# Patient Record
Sex: FEMALE | Race: WHITE | NOT HISPANIC OR LATINO | Employment: OTHER | URBAN - METROPOLITAN AREA
[De-identification: names, ages, dates, MRNs, and addresses within clinical notes are randomized per-mention and may not be internally consistent; named-entity substitution may affect disease eponyms.]

---

## 2018-02-07 ENCOUNTER — TELEPHONE (OUTPATIENT)
Dept: CARDIOLOGY | Facility: CLINIC | Age: 83
End: 2018-02-07

## 2018-02-07 ENCOUNTER — EDUCATION (OUTPATIENT)
Dept: CARDIOLOGY | Facility: CLINIC | Age: 83
End: 2018-02-07

## 2018-02-07 DIAGNOSIS — I35.0 NODULAR CALCIFIC AORTIC VALVE STENOSIS: Primary | ICD-10-CM

## 2018-02-07 NOTE — PROGRESS NOTES
You have been scheduled for a procedure in the echo lab on Friday, February 16, 2018.     Please report to the Cardiology Waiting Area on the Third floor of the hospital and check in at 8 AM.     You will then be taken to the SSCU (Short Stay Cardiac Unit) and prepared for your procedure. Please be aware that this is not the time of your procedure but the time you are to arrive. The procedures are scheduled on an hourly basis; however, emergency cases take precedence over all other cases.     You may not have anything to eat or drink after midnight the night before your test. You may take your regular morning medications with water.    The procedure will take 1-2 hours to perform. After the procedure, you will return to SSCU on the third floor of the hospital. Your family may remain in the room with you during this time.     You will be discharged home that same afternoon. You must have someone to drive you home.  Your doctor will determine, based on your progress, the time of your discharge. The results of your procedure will be discussed with you before you are discharged. Any further testing or procedures will be scheduled for you either before you leave or you will be called with these appointments.     You will be contacted by the echo department prior to your procedure for a  pre-procedure questionnaire.    If you should have any questions, concerns, or need to change the date of your procedure, please call [facility] @ [phone].    Special Instructions        THE ABOVE INSTRUCTIONS WERE GIVEN TO THE PATIENT VERBALLY AND THEY VERBALIZED UNDERSTANDING.  THEY DO NOT REQUIRE ANY SPECIAL NEEDS AND DO NOT HAVE ANY LEARNING BARRIERS.          Directions for Reporting to Cardiology Waiting Area in the Hospital  If you park in the Parking Garage:  Take elevators to the1st floor of the parking garage.  Continue past the gift shop, coffee shop, and piano.  Take a right and go to the gold elevators. (Elevator B)  Take the  elevator to the 3rd floor.  Follow the arrow on the sign on the wall that says Cath Lab Registration/EP Lab Registration.  Follow the long hallway all the way around until you come to a big open area.  This is the registration area.  Check in at Reception Desk.    OR    If family is dropping you off:  Have them drop you off at the front of the Hospital under the green overhang.  Enter through the doors and take a right.  Take the E elevators to the 3rd floor Cardiology Waiting Area.  Check in at the Reception Desk in the waiting room.

## 2018-02-07 NOTE — TELEPHONE ENCOUNTER
Called patient to schedule appointment for IVAN prior to interventional valve clinic appointment. IVAN scheduled for 02/16 @ 10am. Verbalized all pre procedure instructions. Was told to call if had further questions.

## 2018-02-08 ENCOUNTER — TELEPHONE (OUTPATIENT)
Dept: CARDIOLOGY | Facility: CLINIC | Age: 83
End: 2018-02-08

## 2018-02-08 NOTE — TELEPHONE ENCOUNTER
Patient called about IVAN scheduled on 2/16/18. Pre-procedural questions asked.  Denies swallowing issues and esophageal issues. Denies previous sedation/anesthesia problems. States does not snore or have sleep apnea.  Denies recent trauma/surgery/radiation therapy to head/neck/airway. States is able to move neck without difficulty. IVAN described to patient. Instructed NPO past midnight and to have a designated  to drive patient home after the procedures due to sedation being given. Instructed to report to   sscu at 0800 am.  Verbalizes understanding. Questions answered.

## 2018-02-12 ENCOUNTER — HOSPITAL ENCOUNTER (INPATIENT)
Facility: HOSPITAL | Age: 83
LOS: 5 days | Discharge: HOME HEALTH | DRG: 291 | End: 2018-02-17
Attending: EMERGENCY MEDICINE | Admitting: HOSPITALIST
Payer: MEDICARE

## 2018-02-12 DIAGNOSIS — R58 ECCHYMOSIS: ICD-10-CM

## 2018-02-12 DIAGNOSIS — I50.9 CHF (CONGESTIVE HEART FAILURE): ICD-10-CM

## 2018-02-12 DIAGNOSIS — D64.9 ANEMIA, UNSPECIFIED TYPE: ICD-10-CM

## 2018-02-12 DIAGNOSIS — Z95.2 S/P AVR (AORTIC VALVE REPLACEMENT): ICD-10-CM

## 2018-02-12 DIAGNOSIS — T82.03XA PARAVALVULAR LEAK OF PROSTHETIC HEART VALVE, INITIAL ENCOUNTER: ICD-10-CM

## 2018-02-12 DIAGNOSIS — J96.21 ACUTE ON CHRONIC RESPIRATORY FAILURE WITH HYPOXEMIA: ICD-10-CM

## 2018-02-12 DIAGNOSIS — I50.9 ACUTE ON CHRONIC CONGESTIVE HEART FAILURE: ICD-10-CM

## 2018-02-12 DIAGNOSIS — T82.03XA: ICD-10-CM

## 2018-02-12 DIAGNOSIS — I50.9 ACUTE ON CHRONIC CONGESTIVE HEART FAILURE, UNSPECIFIED CONGESTIVE HEART FAILURE TYPE: Primary | ICD-10-CM

## 2018-02-12 DIAGNOSIS — Z85.118 HISTORY OF LUNG CANCER: ICD-10-CM

## 2018-02-12 PROBLEM — L53.9 ERYTHEMA OF BREAST: Status: ACTIVE | Noted: 2018-02-12

## 2018-02-12 PROBLEM — E78.5 HYPERLIPEMIA: Status: ACTIVE | Noted: 2018-02-12

## 2018-02-12 PROBLEM — I25.10 CAD (CORONARY ARTERY DISEASE): Status: ACTIVE | Noted: 2018-02-12

## 2018-02-12 PROBLEM — I10 ESSENTIAL HYPERTENSION: Status: ACTIVE | Noted: 2018-02-12

## 2018-02-12 LAB
ALBUMIN SERPL BCP-MCNC: 3.1 G/DL
ALP SERPL-CCNC: 76 U/L
ALT SERPL W/O P-5'-P-CCNC: 128 U/L
ANION GAP SERPL CALC-SCNC: 10 MMOL/L
AST SERPL-CCNC: 76 U/L
BASOPHILS # BLD AUTO: 0.05 K/UL
BASOPHILS NFR BLD: 0.6 %
BILIRUB SERPL-MCNC: 1.1 MG/DL
BNP SERPL-MCNC: 2108 PG/ML
BUN SERPL-MCNC: 41 MG/DL
CALCIUM SERPL-MCNC: 9.2 MG/DL
CHLORIDE SERPL-SCNC: 106 MMOL/L
CO2 SERPL-SCNC: 24 MMOL/L
CREAT SERPL-MCNC: 1.2 MG/DL
DIFFERENTIAL METHOD: ABNORMAL
EOSINOPHIL # BLD AUTO: 0.1 K/UL
EOSINOPHIL NFR BLD: 0.8 %
ERYTHROCYTE [DISTWIDTH] IN BLOOD BY AUTOMATED COUNT: 25 %
EST. GFR  (AFRICAN AMERICAN): 49 ML/MIN/1.73 M^2
EST. GFR  (NON AFRICAN AMERICAN): 42.5 ML/MIN/1.73 M^2
GLUCOSE SERPL-MCNC: 122 MG/DL
HCT VFR BLD AUTO: 32.4 %
HGB BLD-MCNC: 9.9 G/DL
IMM GRANULOCYTES # BLD AUTO: 0.03 K/UL
IMM GRANULOCYTES NFR BLD AUTO: 0.4 %
INR PPP: 1.1
LYMPHOCYTES # BLD AUTO: 0.6 K/UL
LYMPHOCYTES NFR BLD: 7.8 %
MCH RBC QN AUTO: 28.7 PG
MCHC RBC AUTO-ENTMCNC: 30.6 G/DL
MCV RBC AUTO: 94 FL
MONOCYTES # BLD AUTO: 0.7 K/UL
MONOCYTES NFR BLD: 8.4 %
NEUTROPHILS # BLD AUTO: 6.5 K/UL
NEUTROPHILS NFR BLD: 82 %
NRBC BLD-RTO: 0 /100 WBC
PLATELET # BLD AUTO: 206 K/UL
PMV BLD AUTO: 12.1 FL
POTASSIUM SERPL-SCNC: 4.5 MMOL/L
PROT SERPL-MCNC: 6.2 G/DL
PROTHROMBIN TIME: 11.6 SEC
RBC # BLD AUTO: 3.45 M/UL
SODIUM SERPL-SCNC: 140 MMOL/L
TROPONIN I SERPL DL<=0.01 NG/ML-MCNC: 0.41 NG/ML
WBC # BLD AUTO: 7.97 K/UL

## 2018-02-12 PROCEDURE — 99284 EMERGENCY DEPT VISIT MOD MDM: CPT | Mod: ,,, | Performed by: NURSE PRACTITIONER

## 2018-02-12 PROCEDURE — 63600175 PHARM REV CODE 636 W HCPCS: Performed by: NURSE PRACTITIONER

## 2018-02-12 PROCEDURE — 85610 PROTHROMBIN TIME: CPT

## 2018-02-12 PROCEDURE — 80053 COMPREHEN METABOLIC PANEL: CPT

## 2018-02-12 PROCEDURE — 99223 1ST HOSP IP/OBS HIGH 75: CPT | Mod: ,,, | Performed by: NURSE PRACTITIONER

## 2018-02-12 PROCEDURE — 84484 ASSAY OF TROPONIN QUANT: CPT

## 2018-02-12 PROCEDURE — 93005 ELECTROCARDIOGRAM TRACING: CPT

## 2018-02-12 PROCEDURE — 93010 ELECTROCARDIOGRAM REPORT: CPT | Mod: ,,, | Performed by: INTERNAL MEDICINE

## 2018-02-12 PROCEDURE — 83880 ASSAY OF NATRIURETIC PEPTIDE: CPT

## 2018-02-12 PROCEDURE — 99285 EMERGENCY DEPT VISIT HI MDM: CPT | Mod: 25

## 2018-02-12 PROCEDURE — 12000002 HC ACUTE/MED SURGE SEMI-PRIVATE ROOM

## 2018-02-12 PROCEDURE — 11000001 HC ACUTE MED/SURG PRIVATE ROOM

## 2018-02-12 PROCEDURE — 85025 COMPLETE CBC W/AUTO DIFF WBC: CPT

## 2018-02-12 PROCEDURE — 96374 THER/PROPH/DIAG INJ IV PUSH: CPT

## 2018-02-12 PROCEDURE — 94761 N-INVAS EAR/PLS OXIMETRY MLT: CPT

## 2018-02-12 RX ORDER — SERTRALINE HYDROCHLORIDE 25 MG/1
25 TABLET, FILM COATED ORAL DAILY
COMMUNITY

## 2018-02-12 RX ORDER — OMEPRAZOLE 20 MG/1
20 CAPSULE, DELAYED RELEASE ORAL DAILY
COMMUNITY

## 2018-02-12 RX ORDER — SODIUM CHLORIDE 0.9 % (FLUSH) 0.9 %
5 SYRINGE (ML) INJECTION
Status: DISCONTINUED | OUTPATIENT
Start: 2018-02-12 | End: 2018-02-17 | Stop reason: HOSPADM

## 2018-02-12 RX ORDER — CARVEDILOL 12.5 MG/1
25 TABLET ORAL 2 TIMES DAILY WITH MEALS
Status: DISCONTINUED | OUTPATIENT
Start: 2018-02-13 | End: 2018-02-13

## 2018-02-12 RX ORDER — ONDANSETRON 2 MG/ML
4 INJECTION INTRAMUSCULAR; INTRAVENOUS EVERY 8 HOURS PRN
Status: DISCONTINUED | OUTPATIENT
Start: 2018-02-12 | End: 2018-02-17 | Stop reason: HOSPADM

## 2018-02-12 RX ORDER — VERAPAMIL HYDROCHLORIDE 180 MG/1
180 TABLET, FILM COATED, EXTENDED RELEASE ORAL DAILY
Status: DISCONTINUED | OUTPATIENT
Start: 2018-02-13 | End: 2018-02-13

## 2018-02-12 RX ORDER — ONDANSETRON 8 MG/1
8 TABLET, ORALLY DISINTEGRATING ORAL EVERY 8 HOURS PRN
Status: DISCONTINUED | OUTPATIENT
Start: 2018-02-12 | End: 2018-02-17 | Stop reason: HOSPADM

## 2018-02-12 RX ORDER — ASPIRIN 81 MG/1
81 TABLET ORAL DAILY
Status: DISCONTINUED | OUTPATIENT
Start: 2018-02-13 | End: 2018-02-17 | Stop reason: HOSPADM

## 2018-02-12 RX ORDER — VERAPAMIL HYDROCHLORIDE 180 MG/1
180 CAPSULE, EXTENDED RELEASE ORAL DAILY
Status: ON HOLD | COMMUNITY
End: 2018-02-13

## 2018-02-12 RX ORDER — CLOPIDOGREL BISULFATE 75 MG/1
75 TABLET ORAL DAILY
Status: DISCONTINUED | OUTPATIENT
Start: 2018-02-13 | End: 2018-02-17 | Stop reason: HOSPADM

## 2018-02-12 RX ORDER — GLUCAGON 1 MG
1 KIT INJECTION
Status: DISCONTINUED | OUTPATIENT
Start: 2018-02-12 | End: 2018-02-17 | Stop reason: HOSPADM

## 2018-02-12 RX ORDER — IBUPROFEN 200 MG
24 TABLET ORAL
Status: DISCONTINUED | OUTPATIENT
Start: 2018-02-12 | End: 2018-02-17 | Stop reason: HOSPADM

## 2018-02-12 RX ORDER — HEPARIN SODIUM 5000 [USP'U]/ML
5000 INJECTION, SOLUTION INTRAVENOUS; SUBCUTANEOUS EVERY 8 HOURS
Status: DISCONTINUED | OUTPATIENT
Start: 2018-02-13 | End: 2018-02-13

## 2018-02-12 RX ORDER — BISACODYL 10 MG
10 SUPPOSITORY, RECTAL RECTAL DAILY PRN
Status: DISCONTINUED | OUTPATIENT
Start: 2018-02-12 | End: 2018-02-17 | Stop reason: HOSPADM

## 2018-02-12 RX ORDER — ASPIRIN 81 MG/1
81 TABLET ORAL DAILY
COMMUNITY

## 2018-02-12 RX ORDER — LISINOPRIL 20 MG/1
20 TABLET ORAL DAILY
Status: DISCONTINUED | OUTPATIENT
Start: 2018-02-13 | End: 2018-02-17 | Stop reason: HOSPADM

## 2018-02-12 RX ORDER — IBUPROFEN 200 MG
16 TABLET ORAL
Status: DISCONTINUED | OUTPATIENT
Start: 2018-02-12 | End: 2018-02-17 | Stop reason: HOSPADM

## 2018-02-12 RX ORDER — MECLIZINE HYDROCHLORIDE CHEWABLE TABLETS 25 MG/1
32 TABLET, CHEWABLE ORAL 3 TIMES DAILY PRN
COMMUNITY

## 2018-02-12 RX ORDER — CARVEDILOL 25 MG/1
25 TABLET ORAL 2 TIMES DAILY WITH MEALS
Status: ON HOLD | COMMUNITY
End: 2018-02-13

## 2018-02-12 RX ORDER — SERTRALINE HYDROCHLORIDE 25 MG/1
25 TABLET, FILM COATED ORAL DAILY
Status: DISCONTINUED | OUTPATIENT
Start: 2018-02-13 | End: 2018-02-17 | Stop reason: HOSPADM

## 2018-02-12 RX ORDER — CLOPIDOGREL BISULFATE 75 MG/1
75 TABLET ORAL DAILY
COMMUNITY

## 2018-02-12 RX ORDER — MECLIZINE HYDROCHLORIDE 25 MG/1
25 TABLET ORAL 3 TIMES DAILY PRN
Status: DISCONTINUED | OUTPATIENT
Start: 2018-02-12 | End: 2018-02-17 | Stop reason: HOSPADM

## 2018-02-12 RX ORDER — FUROSEMIDE 20 MG/1
20 TABLET ORAL 2 TIMES DAILY
Status: ON HOLD | COMMUNITY
End: 2018-02-13 | Stop reason: SDUPTHER

## 2018-02-12 RX ORDER — AMOXICILLIN 250 MG
1 CAPSULE ORAL 2 TIMES DAILY PRN
Status: DISCONTINUED | OUTPATIENT
Start: 2018-02-12 | End: 2018-02-17 | Stop reason: HOSPADM

## 2018-02-12 RX ORDER — FUROSEMIDE 10 MG/ML
40 INJECTION INTRAMUSCULAR; INTRAVENOUS 2 TIMES DAILY
Status: DISCONTINUED | OUTPATIENT
Start: 2018-02-13 | End: 2018-02-14

## 2018-02-12 RX ORDER — PANTOPRAZOLE SODIUM 40 MG/1
40 TABLET, DELAYED RELEASE ORAL DAILY
Status: DISCONTINUED | OUTPATIENT
Start: 2018-02-13 | End: 2018-02-17 | Stop reason: HOSPADM

## 2018-02-12 RX ORDER — ACETAMINOPHEN 325 MG/1
650 TABLET ORAL EVERY 6 HOURS PRN
Status: DISCONTINUED | OUTPATIENT
Start: 2018-02-12 | End: 2018-02-13

## 2018-02-12 RX ORDER — FUROSEMIDE 10 MG/ML
40 INJECTION INTRAMUSCULAR; INTRAVENOUS
Status: COMPLETED | OUTPATIENT
Start: 2018-02-12 | End: 2018-02-12

## 2018-02-12 RX ORDER — IPRATROPIUM BROMIDE AND ALBUTEROL SULFATE 2.5; .5 MG/3ML; MG/3ML
3 SOLUTION RESPIRATORY (INHALATION) EVERY 4 HOURS PRN
Status: DISCONTINUED | OUTPATIENT
Start: 2018-02-12 | End: 2018-02-17 | Stop reason: HOSPADM

## 2018-02-12 RX ORDER — LISINOPRIL 20 MG/1
20 TABLET ORAL DAILY
COMMUNITY

## 2018-02-12 RX ADMIN — FUROSEMIDE 40 MG: 10 INJECTION, SOLUTION INTRAMUSCULAR; INTRAVENOUS at 07:02

## 2018-02-12 NOTE — PROVIDER PROGRESS NOTES - EMERGENCY DEPT.
Encounter Date: 2/12/2018    ED Physician Progress Notes         EKG - STEMI Decision  Initial Reading: No STEMI present.    I, Teresa Hutchins, am scribing for, and in the presence of, Dr. Ashby. I performed the above scribed service and the documentation accurately describes the services I performed. I attest to the accuracy of the note.

## 2018-02-12 NOTE — ED PROVIDER NOTES
Encounter Date: 2/12/2018       History     Chief Complaint   Patient presents with    Congestive Heart Failure     pedal edema , increasing SOB. Using PRN O2 at home . Pulse ox 92 % at rest  lower w. exertion.  C/O left breast swelling  also.top of left foot is   bruised. Deneis chest pain .      Pt is a 84 yo female with medical history of CHF, bypass surgery, abdominal aneurysm and lung cancer (not presently on chemo or radiation) presents to the ED with complains of shortness of breath at rest, and with movement.  Symptoms include difficulty breathing, dyspnea on exertion, dyspnea when laying down and + 3 pedal edema. Pt states she takes 40 mg Lasix BID with no relief. Symptoms began 1 week ago, gradually worsening since that time.  Patient denies chest pain, constant cough, drainage from nose, frequent throat clearing, post nasal drip, sputum production, tightness in chest and wheezing. Pt denies and recent travel. Weight has been stable.  Appetite has been decreased. Symptoms are exacerbated by minimal activity and any movement. Pt also complains of left foot bruising.  Pt states she woke up this morning with bruising.  Pt denies any pain, numbness, weakness or trauma.  Pt does take Pradaxa. Pt also complains of left breast redness and warmth.  Pt states she woke up this morning with redness.  Pt denies tenderness or pain.  Pt denies any trauma.              Review of patient's allergies indicates:   Allergen Reactions    Codeine      Past Medical History:   Diagnosis Date    Abdominal aneurysm     Anemia     Bacterial meningitis     Cancer     lung cancer    CHF (congestive heart failure)     Coronary artery disease     Hyperlipidemia     Hypertension      Past Surgical History:   Procedure Laterality Date    CARDIAC PACEMAKER PLACEMENT      CORONARY ARTERY BYPASS GRAFT      EYE SURGERY      LUNG REMOVAL, PARTIAL Left      History reviewed. No pertinent family history.  Social History    Substance Use Topics    Smoking status: Former Smoker    Smokeless tobacco: Not on file    Alcohol use No     Review of Systems   Constitutional: Positive for appetite change. Negative for activity change, chills, fatigue and fever.   HENT: Negative for congestion, facial swelling, sinus pain, sinus pressure, sore throat and trouble swallowing.    Eyes: Negative for photophobia, pain and discharge.   Respiratory: Positive for shortness of breath. Negative for apnea, cough, choking, chest tightness, wheezing and stridor.    Cardiovascular: Positive for leg swelling. Negative for chest pain and palpitations.        Bilateral foot edema   Gastrointestinal: Negative for abdominal distention, abdominal pain, constipation, diarrhea, nausea and vomiting.   Endocrine: Negative.    Genitourinary: Negative for difficulty urinating, dysuria, frequency and urgency.   Musculoskeletal: Negative for arthralgias, back pain, gait problem, joint swelling, myalgias, neck pain and neck stiffness.   Skin: Positive for color change. Negative for pallor, rash and wound.        Ecchymosis noted to top of left foot.    Redness noted to left breast.    Allergic/Immunologic: Negative.    Neurological: Negative for dizziness, tremors, syncope, weakness, numbness and headaches.   Hematological: Negative for adenopathy. Bruises/bleeds easily.   Psychiatric/Behavioral: Negative.        Physical Exam     Initial Vitals [02/12/18 1711]   BP Pulse Resp Temp SpO2   (!) 141/62 98 20 98.1 °F (36.7 °C) (!) 92 %      MAP       88.33         Physical Exam    Nursing note and vitals reviewed.  Constitutional: Vital signs are normal. She appears well-developed and well-nourished. She is cooperative.  Non-toxic appearance. She does not appear ill. No distress.   HENT:   Head: Normocephalic and atraumatic.   Mouth/Throat: Uvula is midline, oropharynx is clear and moist and mucous membranes are normal.   Eyes: EOM are normal. Pupils are equal, round, and  reactive to light.   Neck: Trachea normal, normal range of motion and full passive range of motion without pain. Neck supple. No JVD present.   Cardiovascular: Normal rate, regular rhythm, normal heart sounds and intact distal pulses.   Pulses:       Radial pulses are 2+ on the right side, and 2+ on the left side.        Dorsalis pedis pulses are 1+ on the right side, and 1+ on the left side.   Pulmonary/Chest: Effort normal. She has decreased breath sounds in the right upper field, the right middle field, the right lower field, the left upper field, the left middle field and the left lower field.       Increased work of breathing noted.    Abdominal: Soft. Normal appearance and bowel sounds are normal. There is no tenderness. There is no rigidity, no rebound and no guarding.   Musculoskeletal: Normal range of motion.        Feet:    Neurological: She is alert and oriented to person, place, and time. She has normal strength. GCS eye subscore is 4. GCS verbal subscore is 5. GCS motor subscore is 6.   Skin: Skin is warm, dry and intact. Capillary refill takes 2 to 3 seconds. Ecchymosis noted. No cyanosis. Nails show no clubbing.   Scattered ecchymosis noted    Psychiatric: She has a normal mood and affect. Her speech is normal and behavior is normal. Judgment and thought content normal. Cognition and memory are normal.         ED Course   Procedures  Labs Reviewed   CBC W/ AUTO DIFFERENTIAL - Abnormal; Notable for the following:        Result Value    RBC 3.45 (*)     Hemoglobin 9.9 (*)     Hematocrit 32.4 (*)     MCHC 30.6 (*)     RDW 25.0 (*)     Lymph # 0.6 (*)     Gran% 82.0 (*)     Lymph% 7.8 (*)     All other components within normal limits   COMPREHENSIVE METABOLIC PANEL - Abnormal; Notable for the following:     Glucose 122 (*)     BUN, Bld 41 (*)     Albumin 3.1 (*)     Total Bilirubin 1.1 (*)     AST 76 (*)      (*)     eGFR if  49.0 (*)     eGFR if non  42.5 (*)      All other components within normal limits   TROPONIN I - Abnormal; Notable for the following:     Troponin I 0.413 (*)     All other components within normal limits   B-TYPE NATRIURETIC PEPTIDE - Abnormal; Notable for the following:     BNP 2,108 (*)     All other components within normal limits   PROTIME-INR             Medical Decision Making:   Initial Assessment:   Pt is a 84 yo female with medical history of CHF, bypass surgery, abdominal aneurysm and lung cancer presents to the ED with complains of shortness of breath at rest, and with movement.   Pt also complains of left foot bruising and left breast redness and warmth. Pt denies any chest pain, cough, congestion, abdominal pain or any trauma to breast or foot.    Differential Diagnosis:   Pulmonary edema, CHF exacerbation, COPD, pneumonia.   I considered but do not suspect ACS or Mi.     ED Management:  Will get labs, CXR, hydrate and reassess.   Lasix 40mg IV        APC / Resident Notes:     1900- At bedside to update patient.  Pt to be admitted for CHF exacerbation.  Advised pt of Foot X-ray results.  All questions and concerns addressed.    I discussed the care of this patient with my supervising MD.                ED Course      Clinical Impression:   The primary encounter diagnosis was Acute on chronic congestive heart failure, unspecified congestive heart failure type. A diagnosis of Ecchymosis was also pertinent to this visit.    Disposition:   Disposition: Admitted  Condition: Stable                        Henrietta Gilbert NP  02/12/18 0392

## 2018-02-13 PROBLEM — E46 PROTEIN CALORIE MALNUTRITION: Status: ACTIVE | Noted: 2018-02-13

## 2018-02-13 PROBLEM — Z95.2 S/P AVR (AORTIC VALVE REPLACEMENT): Status: ACTIVE | Noted: 2018-02-13

## 2018-02-13 PROBLEM — E44.1 MILD MALNUTRITION: Status: ACTIVE | Noted: 2018-02-13

## 2018-02-13 PROBLEM — R63.4 WEIGHT LOSS: Status: ACTIVE | Noted: 2018-02-13

## 2018-02-13 PROBLEM — J96.21 ACUTE ON CHRONIC RESPIRATORY FAILURE WITH HYPOXEMIA: Status: ACTIVE | Noted: 2018-02-13

## 2018-02-13 PROBLEM — Z95.0 PACEMAKER: Status: ACTIVE | Noted: 2018-02-13

## 2018-02-13 PROBLEM — R79.89 ELEVATED LFTS: Status: ACTIVE | Noted: 2018-02-13

## 2018-02-13 PROBLEM — Z85.118 HISTORY OF LUNG CANCER: Status: ACTIVE | Noted: 2018-02-13

## 2018-02-13 LAB
ALBUMIN SERPL BCP-MCNC: 3 G/DL
ALP SERPL-CCNC: 72 U/L
ALT SERPL W/O P-5'-P-CCNC: 120 U/L
ANION GAP SERPL CALC-SCNC: 8 MMOL/L
AST SERPL-CCNC: 62 U/L
BASOPHILS # BLD AUTO: 0.03 K/UL
BASOPHILS NFR BLD: 0.4 %
BILIRUB SERPL-MCNC: 1.1 MG/DL
BUN SERPL-MCNC: 40 MG/DL
CALCIUM SERPL-MCNC: 10.1 MG/DL
CHLORIDE SERPL-SCNC: 106 MMOL/L
CHOLEST SERPL-MCNC: 118 MG/DL
CHOLEST/HDLC SERPL: 3 {RATIO}
CO2 SERPL-SCNC: 29 MMOL/L
CREAT SERPL-MCNC: 1.3 MG/DL
DIFFERENTIAL METHOD: ABNORMAL
EOSINOPHIL # BLD AUTO: 0.1 K/UL
EOSINOPHIL NFR BLD: 1 %
ERYTHROCYTE [DISTWIDTH] IN BLOOD BY AUTOMATED COUNT: 25 %
EST. GFR  (AFRICAN AMERICAN): 44.5 ML/MIN/1.73 M^2
EST. GFR  (NON AFRICAN AMERICAN): 38.6 ML/MIN/1.73 M^2
ESTIMATED AVG GLUCOSE: 134 MG/DL
FERRITIN SERPL-MCNC: 189 NG/ML
GLUCOSE SERPL-MCNC: 137 MG/DL
HBA1C MFR BLD HPLC: 6.3 %
HCT VFR BLD AUTO: 31.3 %
HDLC SERPL-MCNC: 39 MG/DL
HDLC SERPL: 33.1 %
HGB BLD-MCNC: 9.6 G/DL
IMM GRANULOCYTES # BLD AUTO: 0.02 K/UL
IMM GRANULOCYTES NFR BLD AUTO: 0.3 %
IRON SERPL-MCNC: 55 UG/DL
LDLC SERPL CALC-MCNC: 61 MG/DL
LYMPHOCYTES # BLD AUTO: 0.5 K/UL
LYMPHOCYTES NFR BLD: 7.8 %
MAGNESIUM SERPL-MCNC: 1.7 MG/DL
MCH RBC QN AUTO: 28.6 PG
MCHC RBC AUTO-ENTMCNC: 30.7 G/DL
MCV RBC AUTO: 93 FL
MONOCYTES # BLD AUTO: 0.5 K/UL
MONOCYTES NFR BLD: 7.2 %
NEUTROPHILS # BLD AUTO: 5.8 K/UL
NEUTROPHILS NFR BLD: 83.3 %
NONHDLC SERPL-MCNC: 79 MG/DL
NRBC BLD-RTO: 0 /100 WBC
PHOSPHATE SERPL-MCNC: 3.3 MG/DL
PLATELET # BLD AUTO: 214 K/UL
PMV BLD AUTO: 11.8 FL
POTASSIUM SERPL-SCNC: 4.2 MMOL/L
PREALB SERPL-MCNC: 23 MG/DL
PROT SERPL-MCNC: 5.9 G/DL
RBC # BLD AUTO: 3.36 M/UL
SATURATED IRON: 14 %
SODIUM SERPL-SCNC: 143 MMOL/L
T4 FREE SERPL-MCNC: 0.98 NG/DL
TOTAL IRON BINDING CAPACITY: 380 UG/DL
TRANSFERRIN SERPL-MCNC: 257 MG/DL
TRIGL SERPL-MCNC: 90 MG/DL
TROPONIN I SERPL DL<=0.01 NG/ML-MCNC: 0.32 NG/ML
TROPONIN I SERPL DL<=0.01 NG/ML-MCNC: 0.36 NG/ML
TSH SERPL DL<=0.005 MIU/L-ACNC: 4.58 UIU/ML
WBC # BLD AUTO: 6.95 K/UL

## 2018-02-13 PROCEDURE — 63600175 PHARM REV CODE 636 W HCPCS: Performed by: HOSPITALIST

## 2018-02-13 PROCEDURE — 84484 ASSAY OF TROPONIN QUANT: CPT

## 2018-02-13 PROCEDURE — 83540 ASSAY OF IRON: CPT

## 2018-02-13 PROCEDURE — 99233 SBSQ HOSP IP/OBS HIGH 50: CPT | Mod: ,,, | Performed by: HOSPITALIST

## 2018-02-13 PROCEDURE — 63600175 PHARM REV CODE 636 W HCPCS: Performed by: NURSE PRACTITIONER

## 2018-02-13 PROCEDURE — 80053 COMPREHEN METABOLIC PANEL: CPT

## 2018-02-13 PROCEDURE — 84100 ASSAY OF PHOSPHORUS: CPT

## 2018-02-13 PROCEDURE — 84134 ASSAY OF PREALBUMIN: CPT

## 2018-02-13 PROCEDURE — 11000001 HC ACUTE MED/SURG PRIVATE ROOM

## 2018-02-13 PROCEDURE — 85025 COMPLETE CBC W/AUTO DIFF WBC: CPT

## 2018-02-13 PROCEDURE — 84443 ASSAY THYROID STIM HORMONE: CPT

## 2018-02-13 PROCEDURE — 84439 ASSAY OF FREE THYROXINE: CPT

## 2018-02-13 PROCEDURE — 83036 HEMOGLOBIN GLYCOSYLATED A1C: CPT

## 2018-02-13 PROCEDURE — 97161 PT EVAL LOW COMPLEX 20 MIN: CPT

## 2018-02-13 PROCEDURE — 25000003 PHARM REV CODE 250: Performed by: HOSPITALIST

## 2018-02-13 PROCEDURE — 82728 ASSAY OF FERRITIN: CPT

## 2018-02-13 PROCEDURE — 25000003 PHARM REV CODE 250: Performed by: NURSE PRACTITIONER

## 2018-02-13 PROCEDURE — 36415 COLL VENOUS BLD VENIPUNCTURE: CPT

## 2018-02-13 PROCEDURE — 97802 MEDICAL NUTRITION INDIV IN: CPT | Performed by: DIETITIAN, REGISTERED

## 2018-02-13 PROCEDURE — 83735 ASSAY OF MAGNESIUM: CPT

## 2018-02-13 PROCEDURE — 80061 LIPID PANEL: CPT

## 2018-02-13 RX ORDER — MAGNESIUM SULFATE HEPTAHYDRATE 40 MG/ML
2 INJECTION, SOLUTION INTRAVENOUS ONCE
Status: DISCONTINUED | OUTPATIENT
Start: 2018-02-13 | End: 2018-02-13

## 2018-02-13 RX ORDER — ATORVASTATIN CALCIUM 20 MG/1
80 TABLET, FILM COATED ORAL DAILY
Status: DISCONTINUED | OUTPATIENT
Start: 2018-02-13 | End: 2018-02-17 | Stop reason: HOSPADM

## 2018-02-13 RX ORDER — DOXYCYCLINE HYCLATE 100 MG
100 TABLET ORAL EVERY 12 HOURS
Status: DISCONTINUED | OUTPATIENT
Start: 2018-02-13 | End: 2018-02-17 | Stop reason: HOSPADM

## 2018-02-13 RX ORDER — ACETAMINOPHEN 325 MG/1
650 TABLET ORAL EVERY 8 HOURS PRN
Status: DISCONTINUED | OUTPATIENT
Start: 2018-02-13 | End: 2018-02-17 | Stop reason: HOSPADM

## 2018-02-13 RX ORDER — FUROSEMIDE 40 MG/1
40 TABLET ORAL DAILY
Status: ON HOLD | COMMUNITY
End: 2018-02-17

## 2018-02-13 RX ORDER — ATORVASTATIN CALCIUM 80 MG/1
80 TABLET, FILM COATED ORAL DAILY
COMMUNITY

## 2018-02-13 RX ORDER — HEPARIN SODIUM 5000 [USP'U]/ML
5000 INJECTION, SOLUTION INTRAVENOUS; SUBCUTANEOUS EVERY 8 HOURS
Status: COMPLETED | OUTPATIENT
Start: 2018-02-13 | End: 2018-02-14

## 2018-02-13 RX ADMIN — HEPARIN SODIUM 5000 UNITS: 5000 INJECTION, SOLUTION INTRAVENOUS; SUBCUTANEOUS at 02:02

## 2018-02-13 RX ADMIN — CLOPIDOGREL 75 MG: 75 TABLET, FILM COATED ORAL at 09:02

## 2018-02-13 RX ADMIN — Medication 1 CAPSULE: at 09:02

## 2018-02-13 RX ADMIN — LISINOPRIL 20 MG: 20 TABLET ORAL at 09:02

## 2018-02-13 RX ADMIN — PANTOPRAZOLE SODIUM 40 MG: 40 TABLET, DELAYED RELEASE ORAL at 09:02

## 2018-02-13 RX ADMIN — DOXYCYCLINE HYCLATE 100 MG: 100 TABLET, COATED ORAL at 04:02

## 2018-02-13 RX ADMIN — SERTRALINE HYDROCHLORIDE 25 MG: 25 TABLET ORAL at 09:02

## 2018-02-13 RX ADMIN — HEPARIN SODIUM 5000 UNITS: 5000 INJECTION, SOLUTION INTRAVENOUS; SUBCUTANEOUS at 09:02

## 2018-02-13 RX ADMIN — FUROSEMIDE 40 MG: 10 INJECTION, SOLUTION INTRAMUSCULAR; INTRAVENOUS at 09:02

## 2018-02-13 RX ADMIN — FUROSEMIDE 40 MG: 10 INJECTION, SOLUTION INTRAMUSCULAR; INTRAVENOUS at 05:02

## 2018-02-13 RX ADMIN — CARVEDILOL 25 MG: 12.5 TABLET, FILM COATED ORAL at 09:02

## 2018-02-13 RX ADMIN — ATORVASTATIN CALCIUM 80 MG: 20 TABLET, FILM COATED ORAL at 03:02

## 2018-02-13 RX ADMIN — STANDARDIZED SENNA CONCENTRATE AND DOCUSATE SODIUM 1 TABLET: 8.6; 5 TABLET, FILM COATED ORAL at 09:02

## 2018-02-13 RX ADMIN — ASPIRIN 81 MG: 81 TABLET, COATED ORAL at 09:02

## 2018-02-13 RX ADMIN — MAGNESIUM SULFATE HEPTAHYDRATE 2 G: 500 INJECTION, SOLUTION INTRAMUSCULAR; INTRAVENOUS at 12:02

## 2018-02-13 NOTE — CONSULTS
"  [facility]  Adult Nutrition  Consult Note    SUMMARY     Recommendations    Recommendation/Intervention:   1. Order Boost Plus as pt with weight loss and decreased appetite. Pt requests vanilla.   2. Continue current cardiac diet.   3. RD following.    Goals: Intake >/=85% EEN/EPN  Nutrition Goal Status: new  Communication of RD Recs:  (POC)    Reason for Assessment    Reason for Assessment: nurse/nurse practitioner consult  Diagnosis: cardiac disease (CHF)  Relevant Medical History: lung ca s/p partial lobectomy, CHF, CAD s/p CABG, HTN, HLD, s/p pacemaker     General Information Comments: Pt reports fair appetite PTA. Weight loss ~20 lb. Follows a low Na diet at home. Agreeable to ONS. Encouraged pt to increase snacking and protein intake at home to prevent further weight loss.    Nutrition Discharge Planning: Adequate PO intake to prevent further weight loss on cardiac diet    Nutrition Prescription Ordered    Current Diet Order: Cardiac                    Evaluation of Received Nutrients/Fluid Intake                                                                                                     % Intake of Estimated Energy Needs: 0 - 25 %  % Meal Intake: Other: Diet just advanced     Nutrition/Diet History    Patient Reported Diet/Restrictions/Preferences: low salt     Food Preferences: No cultural/Congregational preferences noted.        Factors Affecting Nutritional Intake: decreased appetite                Labs/Tests/Procedures/Meds       Pertinent Labs Reviewed: reviewed, pertinent  Pertinent Labs Comments: BUN 40, GFR 38.6, Glu 137, Alb 3.0, PAB 23, T.Bili 1.1  Pertinent Medications Reviewed: reviewed, pertinent  Pertinent Medications Comments: lasix, omega 3 FA, pantoprazole    Physical Findings    Overall Physical Appearance: nourished     Oral/Mouth Cavity: WDL  Skin: edema, intact    Anthropometrics    Temp: 98.2 °F (36.8 °C)     Height: 5' 4" (162.6 cm)  Weight Method: Standard " Scale  Weight: 59.7 kg (131 lb 9.8 oz)     Ideal Body Weight (IBW), Female: 120 lb     % Ideal Body Weight, Female (lb): 109.68 lb  BMI (Calculated): 22.6  BMI Grade: 18.5-24.9 - normal     Usual Body Weight (UBW), k.1 kg     % Usual Body Weight: 86.58  % Weight Change From Usual Weight: -13.6 %             Estimated/Assessed Needs    Weight Used For Calorie Calculations: 59.7 kg (131 lb 9.8 oz)      Energy Calorie Requirements (kcal): 1791  Energy Need Method: Kcal/kg (30)      RMR (Cuming-St. Jeor Equation): 1047        Weight Used For Protein Calculations: 59.7 kg (131 lb 9.8 oz)  Protein Requirements: 60-72 gm (1.0-1.2 gm/kg)  Fluid Requirements (mL): per MD           RDA Method (mL): 1791               Assessment and Plan    Protein calorie malnutrition    Malnutrition in the context of Chronic Illness/Injury    Related to (etiology):  Decreased appetite    Signs and Symptoms (as evidenced by):  Weight Loss: 14% x 6 mo  Fluid Accumulation: moderate    Interventions/Recommendations (treatment strategy):  See recs.    Nutrition Diagnosis Status:  New            Monitor and Evaluation    Food and Nutrient Intake: energy intake, food and beverage intake  Food and Nutrient Adminstration: diet order     Physical Activity and Function: nutrition-related ADLs and IADLs  Anthropometric Measurements: weight, weight change, body mass index  Biochemical Data, Medical Tests and Procedures: electrolyte and renal panel, gastrointestinal profile, glucose/endocrine profile, inflammatory profile  Nutrition-Focused Physical Findings: overall appearance    Nutrition Risk    Level of Risk:  (F/u 1x weekly)    Nutrition Follow-Up    RD Follow-up?: Yes

## 2018-02-13 NOTE — ASSESSMENT & PLAN NOTE
- AST / ALT elevated on arrival   - possibly congestive in nature  - trend daily  - may want to consider abd US

## 2018-02-13 NOTE — HPI
Mrs. Nunez is a pleasant 84yo  female with history of CHF, HTN, Abdominal aneurysm, CAD, HLD, Lung cancer (adenocarinoma, carcinoid) s/p wedge and partial lobectomy 2015, and Anemia presenting with shortness of breath with activity and at rest.  She was recently admitted to [facility] for same complaints and was discharged on 1/29/18 with oxygen 2L/NC.  She reports trouble breathing, dyspnea at rest and with exertion, and bilateral lower extremity edema x 1-2 weeks. She states her Lasix has recently been increased from 20mg once daily to 40mg once daily within last week. She denies CP, cough, fever, chills, headache, and sputum production. She also reports redness of left breast and bruising of her left foot x 1 day.  She states she noticed these this morning when she woke up.  She denies numbness, pain, or trauma to either area; denies discharge from left breast.

## 2018-02-13 NOTE — H&P
[facility] Hospital Medicine  History & Physical    Patient Name: Delmi Nunez  MRN: [id number]  Admission Date: 2/12/2018  Attending Physician: Trina Heath MD   Primary Care Provider: Primary Doctor [facility] Medicine Team: Networked reference to record PCT  Micheal Mancuso NP     Patient information was obtained from patient, relative(s) and ER records.     Subjective:     Principal Problem:Acute on chronic congestive heart failure    Chief Complaint:   Chief Complaint   Patient presents with    Congestive Heart Failure     pedal edema , increasing SOB. Using PRN O2 at home . Pulse ox 92 % at rest  lower w. exertion.  C/O left breast swelling  also.top of left foot is   bruised. Deneis chest pain .         HPI: Mrs. Nunez is a pleasant 84yo  female with history of CHF, HTN, Abdominal aneurysm, CAD, HLD, Lung cancer (adenocarinoma, carcinoid) s/p wedge and partial lobectomy 2015, and Anemia presenting with shortness of breath with activity and at rest.  She was recently admitted to [facility] for same complaints and was discharged on 1/29/18 with oxygen 2L/NC.  She reports trouble breathing, dyspnea at rest and with exertion, and bilateral lower extremity edema x 1-2 weeks. She states her Lasix has recently been increased from 20mg once daily to 40mg once daily within last week. She denies CP, cough, fever, chills, headache, and sputum production. She also reports redness of left breast and bruising of her left foot x 1 day.  She states she noticed these this morning when she woke up.  She denies numbness, pain, or trauma to either area; denies discharge from left breast.    Past Medical History:   Diagnosis Date    Abdominal aneurysm     Anemia     Bacterial meningitis     Cancer     lung cancer    CHF (congestive heart failure)     Coronary artery disease     Hyperlipidemia     Hypertension        Past Surgical History:   Procedure Laterality Date    CARDIAC  PACEMAKER PLACEMENT      CORONARY ARTERY BYPASS GRAFT      EYE SURGERY      LUNG REMOVAL, PARTIAL Left        Review of patient's allergies indicates:   Allergen Reactions    Codeine        No current facility-administered medications on file prior to encounter.      No current outpatient prescriptions on file prior to encounter.     Family History     Problem Relation (Age of Onset)    Diabetes Mother    Heart failure Mother    Intracerebral hemorrhage Father        Social History Main Topics    Smoking status: Former Smoker    Smokeless tobacco: Never Used    Alcohol use No    Drug use: No    Sexual activity: Not on file     Review of Systems   Constitutional: Negative for chills, fatigue and fever.   HENT: Negative for congestion, rhinorrhea and trouble swallowing.    Respiratory: Positive for shortness of breath. Negative for cough and wheezing.    Cardiovascular: Positive for leg swelling. Negative for chest pain and palpitations.        Pedal edema   Gastrointestinal: Negative for abdominal pain, blood in stool, constipation, diarrhea, nausea and vomiting.   Genitourinary: Negative for dysuria and hematuria.   Musculoskeletal: Negative for neck pain and neck stiffness.   Skin:        Left breast redness, Left foot bruising   Neurological: Negative for dizziness, speech difficulty, weakness, light-headedness and headaches.   Hematological: Bruises/bleeds easily.   Psychiatric/Behavioral: Negative for behavioral problems and confusion.     Objective:     Vital Signs (Most Recent):  Temp: 98.1 °F (36.7 °C) (02/12/18 1711)  Pulse: 63 (02/12/18 2222)  Resp: 20 (02/12/18 1910)  BP: (!) 124/59 (02/12/18 2222)  SpO2: 97 % (02/12/18 2222) Vital Signs (24h Range):  Temp:  [98.1 °F (36.7 °C)] 98.1 °F (36.7 °C)  Pulse:  [60-98] 63  Resp:  [20] 20  SpO2:  [92 %-98 %] 97 %  BP: (124-157)/(55-67) 124/59     Weight: 57.2 kg (126 lb)  Body mass index is 21.63 kg/m².    Physical Exam   Constitutional: She is oriented  to person, place, and time. She appears well-developed and well-nourished. No distress.   HENT:   Head: Normocephalic and atraumatic.   Eyes: Conjunctivae and EOM are normal. Pupils are equal, round, and reactive to light.   Neck: Normal range of motion. Neck supple.   Cardiovascular: Normal rate and regular rhythm.    Murmur heard.  Systolic ejection murmur grade III/VI over R and L 2nd intercostal space   Pulmonary/Chest: No respiratory distress. She has rales.       Redness, non tender, no warmth, no discharge    Decreased breath sounds in all carolina, Rales bilateral lower lobe       Abdominal: Soft. Bowel sounds are normal. She exhibits no distension. There is no tenderness.   Musculoskeletal: Normal range of motion.        Feet:    Mild ecchymosis over left foot, non tender; has full ROM and sensation     Bilateral 3+ pitting pedal edema   Neurological: She is alert and oriented to person, place, and time.   Skin: Skin is warm and dry. Capillary refill takes 2 to 3 seconds.   Psychiatric: She has a normal mood and affect. Her behavior is normal. Thought content normal.   Nursing note and vitals reviewed.        CRANIAL NERVES     CN III, IV, VI   Pupils are equal, round, and reactive to light.  Extraocular motions are normal.        Significant Labs:   Blood Culture: No results for input(s): LABBLOO in the last 48 hours.  CBC:   Recent Labs  Lab 02/12/18  1754   WBC 7.97   HGB 9.9*   HCT 32.4*        CMP:   Recent Labs  Lab 02/12/18  1754      K 4.5      CO2 24   *   BUN 41*   CREATININE 1.2   CALCIUM 9.2   PROT 6.2   ALBUMIN 3.1*   BILITOT 1.1*   ALKPHOS 76   AST 76*   *   ANIONGAP 10   EGFRNONAA 42.5*     Troponin:   Recent Labs  Lab 02/12/18  1754   TROPONINI 0.413*     All pertinent labs within the past 24 hours have been reviewed.    Significant Imaging: I have reviewed all pertinent imaging results/findings within the past 24 hours.    Assessment/Plan:     * Acute on  chronic congestive heart failure    84yo F with history of CHF, Aortic stenosis, HTN, HLD, Anemia, abdominal aneurysm, lung cancer (adenocarcinoma, carcinoid) s/p wedge and partial lobectomy 2015, presenting with  Shortness of breath with exertion and at rest. Recently discharged from [facility] for same complaints, sent home with oxygen 2L/NC.    -Acute on Chronic CHF - no prior records on file - previously received care at   - receive 40 mg of furosemide IV in ED - continue BID for now   - monitor renal function closely  -Continue oxygen 2L/NC and wean as tolerated to maintain saturations greater than 92%  -daily wt's  - strict I/O's   - 1.5L fluid restriction   -Consider TTE vs. IVAN- as she is scheduled for IVAN for this Friday 2/16/18 with Dr. Cobos  -may want to consider Cardiology consult in AM  - high risk of falls utilize all safety measures and fall precautions   - PT/OT        CAD (coronary artery disease)    -Continue home medication regimen as directed  - troponin elevated 0.413, denies CP, EKG without obvious ischemic changes, likely related to worsening cardiac function, trend Q 6 x3  - maintain on telemetry  - monitor electrolytes and replete as indicated         Anemia    -Hgb 9.9 on arrival - reports receiving 2U PRBC's recently at   - denies active bleeding  - may want to request records from  to obtain more info r/t recent admission   - trend H/H daily  - iron profile pending   - stool for OCB x1        Erythema of breast    -Ultrasound of left breast pending  -Negative fever and tenderness  -WBC wnl        Elevated LFTs    - AST / ALT elevated on arrival   - possibly congestive in nature  - trend daily  - may want to consider abd US        Essential hypertension    -Continue home dose of Lisinopril, Coreg, Verapamil        Protein calorie malnutrition    - albumin 3.1 with initial labs  - prealbumin pending  - nutrition consulted to help optimize her diet           VTE Risk Mitigation          Ordered     Medium Risk of VTE  Once      02/12/18 2216     Place ANGELINA hose  Until discontinued      02/12/18 2216     Place sequential compression device  Until discontinued      02/12/18 2216             Micheal Mancuso NP  Department of Hospital Medicine   [facility]

## 2018-02-13 NOTE — PT/OT/SLP EVAL
"Physical Therapy Evaluation    Patient Name:  Delmi Nunez   MRN:  [id number]    Recommendations:     Discharge Recommendations:  home with home health (may progress to no needs)   Discharge Equipment Recommendations:  (TBD closer to D/C)   Barriers to discharge: None    Assessment:     Delmi Nunez is a 83 y.o. female admitted with a medical diagnosis of Acute on chronic congestive heart failure.  She presents with the following impairments/functional limitations:  impaired endurance, impaired self care skills, impaired functional mobilty, impaired balance, gait instability, edema, impaired cardiopulmonary response to activity. Pt demo instability of gait 2/2 reports of vertigo. Pt currently req close SBA/CGA for majority of mobility. Will progress as tolerated..    Rehab Prognosis:  Good; patient would benefit from acute skilled PT services to address these deficits and reach maximum level of function.      Recent Surgery: * No surgery found *      Plan:     During this hospitalization, patient to be seen 3 x/week to address the above listed problems via gait training, therapeutic activities, therapeutic exercises, neuromuscular re-education  · Plan of Care Expires:  03/12/18   Plan of Care Reviewed with: patient, spouse, daughter    Subjective     Communicated with RN (Radha) prior to session.  Patient found seated EOB upon PT entry to room, agreeable to evaluation.      Chief Complaint: "I just get so winded and I can't breath."  Patient comments/goals: "I will show you what I have been doing today."  Pain/Comfort:  · Pain Rating 1: 0/10 (pt reports intermittent calf pain in standing)  · Pain Rating Post-Intervention 1: 0/10    Patients cultural, spiritual, Scientologist conflicts given the current situation: Jew    Living Environment:  Pt lives in a [facility] with 0STE with spouse. Pt reports no falls in the home and uses no DME. Pt enjoys cleaning and cooking. Prior to admission, patients level of " function was (I).  Patient has the following equipment: none.  DME owned (not currently used): none.  Upon discharge, patient will have assistance from spouse and daughter.    Objective:     Patient found with:  (none)     General Precautions: Standard, fall   Orthopedic Precautions:N/A   Braces: N/A     Exams:  · Cognitive Exam:  Patient is oriented to Person, Place, Time and Situation and follows 100% of simple commands   · Fine Motor Coordination:    · -       Intact  · Gross Motor Coordination:  WFL    Functional Mobility:  Bed Mobility: Pt reports no difficulty; cannot sleep flat    Sitting Balance at Edge of Bed: Independent    Transfers:   Sit to Stand: Stand-by Assistance with No Assistive Device from EOB, from bedside chair   Stand to Sit: Stand-by Assistance with No Assistive Device    Bed to Chair: Stand Pivot with Stand-by Assistance with No Assistive Device     Therapeutic Activities and Exercises:   PT arrived to pt's room to find pt resting quietly; agreeable to PT session. Pt performed mobility as above to sit [facility]. Pt then agreeable to ambulate to bathroom. Pt ambulated x40 feet total in hospital room to bathroom; x3-4 minor LOB noted with attempts to overcompensate. CGA provided for balance recovery; pt reports this is typical 2/2 vertigo and tendency to rush. Pt able to perform self care in bathroom. Upon return to sitting UI, PT reviewed mobility needs and OOB recommendations. Questions/concerns addressed within PT scope of practice; pt and family with no further questions.    AM-PAC 6 CLICK MOBILITY  Total Score:19     Patient left up in chair with all lines intact, call button in reach and RN notified.    GOALS:    Physical Therapy Goals        Problem: Physical Therapy Goal    Goal Priority Disciplines Outcome Goal Variances Interventions   Physical Therapy Goal     PT/OT, PT Ongoing (interventions implemented as appropriate)     Description:  Goals to be met by: 2/20/2018     Patient will  increase functional independence with mobility by performin. Supine to sit with Kim[place]  2. Sit to supine with Kimble  3. Sit to stand transfer with Modified Kimble using AD or No AD  4. Bed to chair transfer with Modified Kim[place] using AD or No AD  5. Gait x150 feet with Stand-by Assistance using AD or No AD  6. Stand for x10 minutes with Stand-by Assistance while performing dynamic balance tasks  7. Lower extremity exercise program x15 reps per handout, with assistance as needed                      History:     Past Medical History:   Diagnosis Date    Abdominal aneurysm     Anemia     Bacterial meningitis     Cancer     lung cancer    CHF (congestive heart failure)     Coronary artery disease     Hyperlipidemia     Hypertension        Past Surgical History:   Procedure Laterality Date    CARDIAC PACEMAKER PLACEMENT      CORONARY ARTERY BYPASS GRAFT      EYE SURGERY      LUNG REMOVAL, PARTIAL Left        Clinical Decision Making:     History  Co-morbidities and personal factors that may impact the plan of care Examination  Body Structures and Functions, activity limitations and participation restrictions that may impact the plan of care Clinical Presentation   Decision Making/ Complexity Score   Co-morbidities:   [] Time since onset of injury / illness / exacerbation  [] Status of current condition  [x]Patient's cognitive status and safety concerns    [] Multiple Medical Problems (see med hx)  Personal Factors:   [] Patient's age  [] Prior Level of function   [] Patient's home situation (environment and family support)  [] Patient's level of motivation  [] Expected progression of patient      HISTORY:(criteria)    [] 23680 - no personal factors/history    [x] 29180 - has 1-2 personal factor/comorbidity     [] 22671 - has >3 personal factor/comorbidity     Body Regions:  [] Objective examination findings  [] Head     []  Neck  [x] Trunk   [] Upper Extremity  [x] Lower  Extremity    Body Systems:  [] For communication ability, affect, cognition, language, and learning style: the assessment of the ability to make needs known, consciousness, orientation (person, place, and time), expected emotional /behavioral responses, and learning preferences (eg, learning barriers, education  needs)  [] For the neuromuscular system: a general assessment of gross coordinated movement (eg, balance, gait, locomotion, transfers, and transitions) and motor function  (motor control and motor learning)  [] For the musculoskeletal system: the assessment of gross symmetry, gross range of motion, gross strength, height, and weight  [] For the integumentary system: the assessment of pliability(texture), presence of scar formation, skin color, and skin integrity  [x] For cardiovascular/pulmonary system: the assessment of heart rate, respiratory rate, blood pressure, and edema     Activity limitations:    [] Patient's cognitive status and saf ety concerns          [] Status of current condition      [] Weight bearing restriction  [] Cardiopulmunary Restriction    Participation Restrictions:   [x] Goals and goal agreement with the patient     [] Rehab potential (prognosis) and probable outcome      Examination of Body System: (criteria)    [x] 65052 - addressing 1-2 elements    [] 47746 - addressing a total of 3 or more elements     [] 32750 -  Addressing a total of 4 or more elements         Clinical Presentation: (criteria)  Stable - 82091     On examination of body system using standardized tests and measures patient presents with 1-2 elements from any of the following: body structures and functions, activity limitations, and/or participation restrictions.  Leading to a clinical presentation that is considered stable and/or uncomplicated                              Clinical Decision Making  (Eval Complexity):  Low- 80894     Time Tracking:     PT Received On: 02/13/18  PT Start Time: 0859     PT Stop Time:  0913  PT Total Time (min): 14 min     Billable Minutes: Evaluation 14    Rae Mark, PT, DPT  [phone]  2/13/2018

## 2018-02-13 NOTE — SUBJECTIVE & OBJECTIVE
Past Medical History:   Diagnosis Date    Abdominal aneurysm     Anemia     Bacterial meningitis     Cancer     lung cancer    CHF (congestive heart failure)     Coronary artery disease     Hyperlipidemia     Hypertension        Past Surgical History:   Procedure Laterality Date    CARDIAC PACEMAKER PLACEMENT      CORONARY ARTERY BYPASS GRAFT      EYE SURGERY      LUNG REMOVAL, PARTIAL Left        Review of patient's allergies indicates:   Allergen Reactions    Codeine        No current facility-administered medications on file prior to encounter.      No current outpatient prescriptions on file prior to encounter.     Family History     Problem Relation (Age of Onset)    Diabetes Mother    Heart failure Mother    Intracerebral hemorrhage Father        Social History Main Topics    Smoking status: Former Smoker    Smokeless tobacco: Never Used    Alcohol use No    Drug use: No    Sexual activity: Not on file     Review of Systems   Constitutional: Negative for chills, fatigue and fever.   HENT: Negative for congestion, rhinorrhea and trouble swallowing.    Respiratory: Positive for shortness of breath. Negative for cough and wheezing.    Cardiovascular: Positive for leg swelling. Negative for chest pain and palpitations.        Pedal edema   Gastrointestinal: Negative for abdominal pain, blood in stool, constipation, diarrhea, nausea and vomiting.   Genitourinary: Negative for dysuria and hematuria.   Musculoskeletal: Negative for neck pain and neck stiffness.   Skin:        Left breast redness, Left foot bruising   Neurological: Negative for dizziness, speech difficulty, weakness, light-headedness and headaches.   Hematological: Bruises/bleeds easily.   Psychiatric/Behavioral: Negative for behavioral problems and confusion.     Objective:     Vital Signs (Most Recent):  Temp: 98.1 °F (36.7 °C) (02/12/18 1711)  Pulse: 63 (02/12/18 2222)  Resp: 20 (02/12/18 1910)  BP: (!) 124/59 (02/12/18  2222)  SpO2: 97 % (02/12/18 2222) Vital Signs (24h Range):  Temp:  [98.1 °F (36.7 °C)] 98.1 °F (36.7 °C)  Pulse:  [60-98] 63  Resp:  [20] 20  SpO2:  [92 %-98 %] 97 %  BP: (124-157)/(55-67) 124/59     Weight: 57.2 kg (126 lb)  Body mass index is 21.63 kg/m².    Physical Exam   Constitutional: She is oriented to person, place, and time. She appears well-developed and well-nourished. No distress.   HENT:   Head: Normocephalic and atraumatic.   Eyes: Conjunctivae and EOM are normal. Pupils are equal, round, and reactive to light.   Neck: Normal range of motion. Neck supple.   Cardiovascular: Normal rate and regular rhythm.    Murmur heard.  Systolic ejection murmur grade III/VI over R and L 2nd intercostal space   Pulmonary/Chest: No respiratory distress. She has rales.       Redness, non tender, no warmth, no discharge    Decreased breath sounds in all carolina, Rales bilateral lower lobe       Abdominal: Soft. Bowel sounds are normal. She exhibits no distension. There is no tenderness.   Musculoskeletal: Normal range of motion.        Feet:    Mild ecchymosis over left foot, non tender; has full ROM and sensation     Bilateral 3+ pitting pedal edema   Neurological: She is alert and oriented to person, place, and time.   Skin: Skin is warm and dry. Capillary refill takes 2 to 3 seconds.   Psychiatric: She has a normal mood and affect. Her behavior is normal. Thought content normal.   Nursing note and vitals reviewed.        CRANIAL NERVES     CN III, IV, VI   Pupils are equal, round, and reactive to light.  Extraocular motions are normal.        Significant Labs:   Blood Culture: No results for input(s): LABBLOO in the last 48 hours.  CBC:   Recent Labs  Lab 02/12/18  1754   WBC 7.97   HGB 9.9*   HCT 32.4*        CMP:   Recent Labs  Lab 02/12/18  1754      K 4.5      CO2 24   *   BUN 41*   CREATININE 1.2   CALCIUM 9.2   PROT 6.2   ALBUMIN 3.1*   BILITOT 1.1*   ALKPHOS 76   AST 76*   *    ANIONGAP 10   EGFRNONAA 42.5*     Troponin:   Recent Labs  Lab 02/12/18  1754   TROPONINI 0.413*     All pertinent labs within the past 24 hours have been reviewed.    Significant Imaging: I have reviewed all pertinent imaging results/findings within the past 24 hours.

## 2018-02-13 NOTE — ED NOTES
Hourly rounding complete.  Patient updated on plan of care and current status. Items within reach. Toileting offered. Family at bedside. Patient without complaints at this time.

## 2018-02-13 NOTE — ED NOTES
Patient getting to bedside commode with standby assist. Provided with extra blankets. Updated on POC. Requesting food.

## 2018-02-13 NOTE — PROGRESS NOTES
Progress Note   [facility] Medicine - [facility] [place]       Team: [facility] MED C Gisela Cobos MD  Admit Date: 2/12/2018  Length of Stay:  LOS: 1 day   LUISITO 2/14/2018  Principal Problem:  Acute on chronic congestive heart failure    HPI:  Mrs. Nunez is a pleasant 84yo  female with history of CHF (unknown details), chronic resp failure on home O2 2L x 2 weeks, HTN, CAD (?details), HTN, HLD, AAA (?details), h/o lung cancer (adenocarcinoma, ?carcinoid) s/p wedge and partial lobectomy '15, and anemia who presented to ED with shortness of breath with activity and at rest and B LE edema.  She was recently admitted to [facility] for same complaints and was discharged on 1/29/18 with home oxygen 2L/NC.  She states her Lasix has recently been increased from 20mg once daily to 40mg once daily within last week. She denies CP, cough, fever, chills, headache, and sputum production. She also reports redness of left breast and bruising of her left foot x 1 day.  She states she noticed these this morning when she woke up.  She denies numbness, pain, or trauma to either area; denies discharge from left breast    Hospital Course: Admitted to [facility] [place], started on Lasix 40 IV BID    Interval hx / overnight events: net -780 cc x 12 hours, continues with AVILES, consult Valve team tomorrow    Areas of concern/ handoff: none    ROS:  Pain Scale: 0 /10   Constitutional: no fever or chills  Respiratory: no cough or shortness of breath  Cardiovascular: no chest pain or palpitations  Gastrointestinal: no nausea or vomiting, no abdominal pain or change in bowel habits  Genitourinary: no hematuria or dysuria  Integument/Breast: no rash or pruritis  Hematologic/Lymphatic: no easy bruising or lymphadenopathy  Musculoskeletal: no arthralgias or myalgias  Neurological: no seizures or tremors  Behavioral/Psych: no depression or anxiety      Vital Signs Range (Last 24H):  Temp:  [98.1 °F (36.7 °C)-98.2 °F (36.8 °C)]   Pulse:  [60-98]   Resp:  [18-27]   BP:  (124-159)/(55-76)   SpO2:  [92 %-99 %]     I & O (Last 24H):  Intake/Output Summary (Last 24 hours) at 02/13/18 1047  Last data filed at 02/13/18 0940   Gross per 24 hour   Intake              120 ml   Output              900 ml   Net             -780 ml       Physical Exam:  General appearance: no distress, pale, elderly, well-groomed  Mental status: Alert and oriented x 3, pleasant  HEENT:  conjunctivae/corneas clear, PERRL  Neck: supple, thyroid not enlarged, +JVD  Pulm:   normal respiratory effort, CTA B, no c/w/r  Card: RRR, S1, S2 normal, +systolic murmur, no click, rub or gallop  Abd: soft, NT, ND, BS present; no masses, no organomegaly  Ext: no c/c/e  Pulses: 2+, symmetric  Skin: color, texture, turgor normal. No rashes or lesions  Neuro: CN II-XII grossly intact, no focal numbness or weakness, normal strength and tone     Diagnostic Results:  Labs reviewed    Recent Results (from the past 24 hour(s))   CBC auto differential    Collection Time: 02/12/18  5:54 PM   Result Value Ref Range    WBC 7.97 3.90 - 12.70 K/uL    RBC 3.45 (L) 4.00 - 5.40 M/uL    Hemoglobin 9.9 (L) 12.0 - 16.0 g/dL    Hematocrit 32.4 (L) 37.0 - 48.5 %    MCV 94 82 - 98 fL    MCH 28.7 27.0 - 31.0 pg    MCHC 30.6 (L) 32.0 - 36.0 g/dL    RDW 25.0 (H) 11.5 - 14.5 %    Platelets 206 150 - 350 K/uL    MPV 12.1 9.2 - 12.9 fL    Immature Granulocytes 0.4 0.0 - 0.5 %    Gran # (ANC) 6.5 1.8 - 7.7 K/uL    Immature Grans (Abs) 0.03 0.00 - 0.04 K/uL    Lymph # 0.6 (L) 1.0 - 4.8 K/uL    Mono # 0.7 0.3 - 1.0 K/uL    Eos # 0.1 0.0 - 0.5 K/uL    Baso # 0.05 0.00 - 0.20 K/uL    nRBC 0 0 /100 WBC    Gran% 82.0 (H) 38.0 - 73.0 %    Lymph% 7.8 (L) 18.0 - 48.0 %    Mono% 8.4 4.0 - 15.0 %    Eosinophil% 0.8 0.0 - 8.0 %    Basophil% 0.6 0.0 - 1.9 %    Differential Method Automated    Comprehensive metabolic panel    Collection Time: 02/12/18  5:54 PM   Result Value Ref Range    Sodium 140 136 - 145 mmol/L    Potassium 4.5 3.5 - 5.1 mmol/L    Chloride 106 95 -  110 mmol/L    CO2 24 23 - 29 mmol/L    Glucose 122 (H) 70 - 110 mg/dL    BUN, Bld 41 (H) 8 - 23 mg/dL    Creatinine 1.2 0.5 - 1.4 mg/dL    Calcium 9.2 8.7 - 10.5 mg/dL    Total Protein 6.2 6.0 - 8.4 g/dL    Albumin 3.1 (L) 3.5 - 5.2 g/dL    Total Bilirubin 1.1 (H) 0.1 - 1.0 mg/dL    Alkaline Phosphatase 76 55 - 135 U/L    AST 76 (H) 10 - 40 U/L     (H) 10 - 44 U/L    Anion Gap 10 8 - 16 mmol/L    eGFR if African American 49.0 (A) >60 mL/min/1.73 m^2    eGFR if non  42.5 (A) >60 mL/min/1.73 m^2   Troponin I    Collection Time: 02/12/18  5:54 PM   Result Value Ref Range    Troponin I 0.413 (H) 0.000 - 0.026 ng/mL   Brain natriuretic peptide    Collection Time: 02/12/18  5:54 PM   Result Value Ref Range    BNP 2,108 (H) 0 - 99 pg/mL   Protime-INR    Collection Time: 02/12/18  5:54 PM   Result Value Ref Range    Prothrombin Time 11.6 9.0 - 12.5 sec    INR 1.1 0.8 - 1.2   TSH    Collection Time: 02/13/18  6:48 AM   Result Value Ref Range    TSH 4.582 (H) 0.400 - 4.000 uIU/mL   Comprehensive Metabolic Panel (CMP)    Collection Time: 02/13/18  6:48 AM   Result Value Ref Range    Sodium 143 136 - 145 mmol/L    Potassium 4.2 3.5 - 5.1 mmol/L    Chloride 106 95 - 110 mmol/L    CO2 29 23 - 29 mmol/L    Glucose 137 (H) 70 - 110 mg/dL    BUN, Bld 40 (H) 8 - 23 mg/dL    Creatinine 1.3 0.5 - 1.4 mg/dL    Calcium 10.1 8.7 - 10.5 mg/dL    Total Protein 5.9 (L) 6.0 - 8.4 g/dL    Albumin 3.0 (L) 3.5 - 5.2 g/dL    Total Bilirubin 1.1 (H) 0.1 - 1.0 mg/dL    Alkaline Phosphatase 72 55 - 135 U/L    AST 62 (H) 10 - 40 U/L     (H) 10 - 44 U/L    Anion Gap 8 8 - 16 mmol/L    eGFR if African American 44.5 (A) >60 mL/min/1.73 m^2    eGFR if non  38.6 (A) >60 mL/min/1.73 m^2   Magnesium    Collection Time: 02/13/18  6:48 AM   Result Value Ref Range    Magnesium 1.7 1.6 - 2.6 mg/dL   Phosphorus    Collection Time: 02/13/18  6:48 AM   Result Value Ref Range    Phosphorus 3.3 2.7 - 4.5 mg/dL   Lipid  panel    Collection Time: 02/13/18  6:48 AM   Result Value Ref Range    Cholesterol 118 (L) 120 - 199 mg/dL    Triglycerides 90 30 - 150 mg/dL    HDL 39 (L) 40 - 75 mg/dL    LDL Cholesterol 61.0 (L) 63.0 - 159.0 mg/dL    HDL/Chol Ratio 33.1 20.0 - 50.0 %    Total Cholesterol/HDL Ratio 3.0 2.0 - 5.0    Non-HDL Cholesterol 79 mg/dL   CBC with Automated Differential    Collection Time: 02/13/18  6:48 AM   Result Value Ref Range    WBC 6.95 3.90 - 12.70 K/uL    RBC 3.36 (L) 4.00 - 5.40 M/uL    Hemoglobin 9.6 (L) 12.0 - 16.0 g/dL    Hematocrit 31.3 (L) 37.0 - 48.5 %    MCV 93 82 - 98 fL    MCH 28.6 27.0 - 31.0 pg    MCHC 30.7 (L) 32.0 - 36.0 g/dL    RDW 25.0 (H) 11.5 - 14.5 %    Platelets 214 150 - 350 K/uL    MPV 11.8 9.2 - 12.9 fL    Immature Granulocytes 0.3 0.0 - 0.5 %    Gran # (ANC) 5.8 1.8 - 7.7 K/uL    Immature Grans (Abs) 0.02 0.00 - 0.04 K/uL    Lymph # 0.5 (L) 1.0 - 4.8 K/uL    Mono # 0.5 0.3 - 1.0 K/uL    Eos # 0.1 0.0 - 0.5 K/uL    Baso # 0.03 0.00 - 0.20 K/uL    nRBC 0 0 /100 WBC    Gran% 83.3 (H) 38.0 - 73.0 %    Lymph% 7.8 (L) 18.0 - 48.0 %    Mono% 7.2 4.0 - 15.0 %    Eosinophil% 1.0 0.0 - 8.0 %    Basophil% 0.4 0.0 - 1.9 %    Differential Method Automated    Iron and TIBC    Collection Time: 02/13/18  6:48 AM   Result Value Ref Range    Iron 55 30 - 160 ug/dL    Transferrin 257 200 - 375 mg/dL    TIBC 380 250 - 450 ug/dL    Saturated Iron 14 (L) 20 - 50 %   Ferritin    Collection Time: 02/13/18  6:48 AM   Result Value Ref Range    Ferritin 189 20.0 - 300.0 ng/mL   Prealbumin    Collection Time: 02/13/18  6:48 AM   Result Value Ref Range    Prealbumin 23 20 - 43 mg/dL   T4, free    Collection Time: 02/13/18  6:48 AM   Result Value Ref Range    Free T4 0.98 0.71 - 1.51 ng/dL   Troponin I    Collection Time: 02/13/18  7:07 AM   Result Value Ref Range    Troponin I 0.361 (H) 0.000 - 0.026 ng/mL       No results for input(s): POCTGLUCOSE in the last 168 hours.       aspirin  81 mg Oral Daily    carvedilol   25 mg Oral BID WM    clopidogrel  75 mg Oral Daily    furosemide  40 mg Intravenous BID    lisinopril  20 mg Oral Daily    omega-3 fatty acids-fish oil  1 capsule Oral Daily    pantoprazole  40 mg Oral Daily    sertraline  25 mg Oral Daily    verapamil  180 mg Oral Daily       Assessment and Plan     Active Hospital Problems    Diagnosis  POA    *Acute on chronic congestive heart failure [I50.9]  Yes    Protein calorie malnutrition [E46]  Yes    Elevated LFTs [R79.89]  Yes    Acute on chronic respiratory failure with hypoxemia [J96.21]  Yes    Mild malnutrition [E44.1]  Yes    Pacemaker [Z95.0]  Yes     '03 for heart block      History of lung cancer [Z85.118]  Not Applicable     Pulm: Dr Berrios at [facility]      S/P AVR (aortic valve replacement) [Z95.2]  Not Applicable     Bovine '03      Weight loss [R63.4]  Yes    CAD (coronary artery disease) [I25.10]  Yes    Essential hypertension [I10]  Yes    Anemia [D64.9]  Yes    Erythema of breast [L53.9]  Yes      Resolved Hospital Problems    Diagnosis Date Resolved POA   No resolved problems to display.       Problems Addressed today:    Acute on chronic systolic CHF  Severe AS  Chronic hypoxemic respiratory failure on home O2 2L  - no prior records on file - previously received care at   - Lasix 40 IV BID for now  - Continue oxygen 2L/NC and wean as tolerated to maintain saturations greater than 92%  - daily wt's  - strict I/O's   - 1.5L fluid restriction while inpt   - maintain on telemetry  - keep Mg >2, K >4  - TTE vs. IVAN- she is scheduled for elective IVAN for this Friday 2/16/18 per Dr DAO Cobos's order for pre-TAVR clinic IVAN; family would like for her to have IVAN as inpatient  - consult Valve/Interventional cardiology tomorrow morning    L breast erythema - DDx cellulitis, other. ?peau d'orange. Negative fever and tenderness. WBC wnl but with 82% granulocytes  - f/u Ultrasound of breast  - trial of Doxy 100 PO BID    Essential HTN  - home  Lisinopril 20  - NO BB for now (not on BB at home and may need to give positive inotropic support)    CAD - ?details  - troponin elevated 0.413, denies CP, EKG without obvious ischemic changes, likely related to worsening cardiac function, trend Q 6 x3    Transaminitis - likely congestive hepatopathy  - monitor for improvement after diuresis  - daily CMP  - if no improvement, abd US    Abnormal thyroid labs - TSH slightly elevated but FT4 wnl  - re-check TFTs when out of acute setting    Anemia of acute on chronic illness. Unclear etiology - had EGD and C-scope at [facility] with no source found; VCE not done. No BPR, no other blood loss. Recent transfusion of 2u pRBCs at [facility] 1/25. Fe studies unremarkable (ferritin wnl, TIBC wnl) except Fe sat low at 14%  - check retic count  - outpt Hematology eval, may need BMBx      Elevated A1C - 6.3 - prediabetes  -  on diet and lifestyle modifications    Weight loss - 30 pounds in 3-6 months. Prior weight 157 --> 128 (dry weight)    HypoMg - replace IV    Mild malnutrition - Alb 3.0. Prealbumin wnl.  - f/u nutrition consult  - start MVI    Deconditioning   - high risk of falls; utilize all safety measures and fall precautions   - PT/OT    PPX: SCDs; start hep 5K q8    Goals of Care/Advanced Directives: full code    Disposition/Post-Acute Care: pending clinical condition, likely home with no services    Time spent in care of the patient (Greater than 1/2 spent in direct face-to-face contact) 45 minutes    Gisela Cobos MD

## 2018-02-13 NOTE — PLAN OF CARE
Problem: Patient Care Overview  Goal: Plan of Care Review  Outcome: Revised  Pt free of falls/trauma/injuries.  Denies c/o SOB, CP, or discomfort.  Generalized skin remains CDI; 2-3+ pitting edema noted to BLEs.  Pt being diuresed with Lasix 40mg IVP BID; diuresing well.   Wt remains stable.  Electrolytes replaced as ordered.  Plan for 2D echo and US of L breast.  PT/OT following.  Family at the bedside.  Pt and family tolerating plan of care.

## 2018-02-13 NOTE — ASSESSMENT & PLAN NOTE
Malnutrition in the context of Chronic Illness/Injury    Related to (etiology):  Decreased appetite    Signs and Symptoms (as evidenced by):  Weight Loss: 14% x 6 mo  Fluid Accumulation: moderate    Interventions/Recommendations (treatment strategy):  See recs.    Nutrition Diagnosis Status:  New

## 2018-02-13 NOTE — PLAN OF CARE
Problem: Physical Therapy Goal  Goal: Physical Therapy Goal  Goals to be met by: 2018     Patient will increase functional independence with mobility by performin. Supine to sit with [place]  2. Sit to supine with [place]  3. Sit to stand transfer with Modified [place] using AD or No AD  4. Bed to chair transfer with Modified [place] using AD or No AD  5. Gait x150 feet with Stand-by Assistance using AD or No AD  6. Stand for x10 minutes with Stand-by Assistance while performing dynamic balance tasks  7. Lower extremity exercise program x15 reps per handout, with assistance as needed    Outcome: Ongoing (interventions implemented as appropriate)    PT Evaluation complete. Recommending HH upon D/C. May progress to no needs. Please see full note for details.    Rae Mark, PT, DPT  [phone]  2018

## 2018-02-13 NOTE — PLAN OF CARE
Problem: Patient Care Overview  Goal: Plan of Care Review    Recommendations     Recommendation/Intervention:   1. Order Boost Plus as pt with weight loss and decreased appetite. Pt requests vanilla.   2. Continue current cardiac diet.   3. RD following.     Goals: Intake >/=85% EEN/EPN  Nutrition Goal Status: new

## 2018-02-13 NOTE — ED NOTES
LOC: The patient is awake, alert and aware of environment with an appropriate affect, the patient is oriented x 3 and speaking appropriately.  APPEARANCE: Patient is clean and well groomed, patient's clothing is properly fastened.  SKIN: The skin is warm and dry, patient has normal skin turgor and moist mucus membranes, skin intact; diffuse bruising noted to bilateral upper extremities; left breast swollen, redness noted   MUSKULOSKELETAL: Patient moving all extremities well, no deformities noted.  RESPIRATORY: Airway is open and patent, respirations are spontaneous; mild respiratory distress noted; respiratory rate of 26, accessory muscle use noted  CARDIAC: Normal heart sounds; bilateral lower extremity pitting edema  ABDOMEN: Soft and non tender to palpation, no distention noted. Bowel sounds present.  NEURO: No neuro deficits, hand grasp equal, no drift noted, no facial droop noted. Speech is clear.

## 2018-02-13 NOTE — ASSESSMENT & PLAN NOTE
-Continue home medication regimen as directed  - troponin elevated 0.413, denies CP, EKG without obvious ischemic changes, likely related to worsening cardiac function, trend Q 6 x3  - maintain on telemetry  - monitor electrolytes and replete as indicated

## 2018-02-13 NOTE — ASSESSMENT & PLAN NOTE
- albumin 3.1 with initial labs  - prealbumin pending  - nutrition consulted to help optimize her diet

## 2018-02-13 NOTE — ASSESSMENT & PLAN NOTE
82yo F with history of CHF, Aortic stenosis, HTN, HLD, Anemia, abdominal aneurysm, lung cancer (adenocarcinoma, carcinoid) s/p wedge and partial lobectomy 2015, presenting with  Shortness of breath with exertion and at rest. Recently discharged from [facility] for same complaints, sent home with oxygen 2L/NC.    -Acute on Chronic CHF - no prior records on file - previously received care at   - receive 40 mg of furosemide IV in ED - continue BID for now   - monitor renal function closely  -Continue oxygen 2L/NC and wean as tolerated to maintain saturations greater than 92%  -daily wt's  - strict I/O's   - 1.5L fluid restriction   -Consider TTE vs. IVAN- as she is scheduled for IVAN for this Friday 2/16/18 with Dr. Cobos  -may want to consider Cardiology consult in AM  - high risk of falls utilize all safety measures and fall precautions   - PT/OT

## 2018-02-13 NOTE — ASSESSMENT & PLAN NOTE
-Hgb 9.9 on arrival - reports receiving 2U PRBC's recently at   - denies active bleeding  - may want to request records from  to obtain more info r/t recent admission   - trend H/H daily  - iron profile pending   - stool for OCB x1

## 2018-02-14 ENCOUNTER — ANESTHESIA EVENT (OUTPATIENT)
Dept: MEDSURG UNIT | Facility: HOSPITAL | Age: 83
DRG: 291 | End: 2018-02-14
Payer: MEDICARE

## 2018-02-14 PROBLEM — I70.0 ATHEROSCLEROSIS OF AORTA: Status: ACTIVE | Noted: 2018-02-14

## 2018-02-14 LAB
ALBUMIN SERPL BCP-MCNC: 2.8 G/DL
ALP SERPL-CCNC: 76 U/L
ALT SERPL W/O P-5'-P-CCNC: 103 U/L
ANION GAP SERPL CALC-SCNC: 10 MMOL/L
ANISOCYTOSIS BLD QL SMEAR: SLIGHT
AST SERPL-CCNC: 48 U/L
BASOPHILS # BLD AUTO: 0.05 K/UL
BASOPHILS NFR BLD: 0.6 %
BILIRUB SERPL-MCNC: 1 MG/DL
BUN SERPL-MCNC: 39 MG/DL
BURR CELLS BLD QL SMEAR: ABNORMAL
CALCIUM SERPL-MCNC: 9.3 MG/DL
CHLORIDE SERPL-SCNC: 105 MMOL/L
CO2 SERPL-SCNC: 27 MMOL/L
CREAT SERPL-MCNC: 1.3 MG/DL
DIFFERENTIAL METHOD: ABNORMAL
EOSINOPHIL # BLD AUTO: 0.1 K/UL
EOSINOPHIL NFR BLD: 1 %
ERYTHROCYTE [DISTWIDTH] IN BLOOD BY AUTOMATED COUNT: 24.8 %
EST. GFR  (AFRICAN AMERICAN): 44.5 ML/MIN/1.73 M^2
EST. GFR  (NON AFRICAN AMERICAN): 38.6 ML/MIN/1.73 M^2
GLUCOSE SERPL-MCNC: 150 MG/DL
HCT VFR BLD AUTO: 30.9 %
HGB BLD-MCNC: 9.3 G/DL
HYPOCHROMIA BLD QL SMEAR: ABNORMAL
IMM GRANULOCYTES # BLD AUTO: 0.04 K/UL
IMM GRANULOCYTES NFR BLD AUTO: 0.5 %
LYMPHOCYTES # BLD AUTO: 0.4 K/UL
LYMPHOCYTES NFR BLD: 4.6 %
MAGNESIUM SERPL-MCNC: 1.7 MG/DL
MCH RBC QN AUTO: 28.3 PG
MCHC RBC AUTO-ENTMCNC: 30.1 G/DL
MCV RBC AUTO: 94 FL
MONOCYTES # BLD AUTO: 0.7 K/UL
MONOCYTES NFR BLD: 8.3 %
NEUTROPHILS # BLD AUTO: 6.7 K/UL
NEUTROPHILS NFR BLD: 85 %
NRBC BLD-RTO: 0 /100 WBC
OB PNL STL: NEGATIVE
OVALOCYTES BLD QL SMEAR: ABNORMAL
PHOSPHATE SERPL-MCNC: 3.2 MG/DL
PLATELET # BLD AUTO: 209 K/UL
PMV BLD AUTO: 11.8 FL
POIKILOCYTOSIS BLD QL SMEAR: SLIGHT
POLYCHROMASIA BLD QL SMEAR: ABNORMAL
POTASSIUM SERPL-SCNC: 3.9 MMOL/L
PROT SERPL-MCNC: 5.6 G/DL
RBC # BLD AUTO: 3.29 M/UL
SODIUM SERPL-SCNC: 142 MMOL/L
WBC # BLD AUTO: 7.87 K/UL

## 2018-02-14 PROCEDURE — 63600175 PHARM REV CODE 636 W HCPCS: Performed by: NURSE PRACTITIONER

## 2018-02-14 PROCEDURE — 93306 TTE W/DOPPLER COMPLETE: CPT | Mod: 26,,, | Performed by: INTERNAL MEDICINE

## 2018-02-14 PROCEDURE — 11000001 HC ACUTE MED/SURG PRIVATE ROOM

## 2018-02-14 PROCEDURE — 82272 OCCULT BLD FECES 1-3 TESTS: CPT

## 2018-02-14 PROCEDURE — 36415 COLL VENOUS BLD VENIPUNCTURE: CPT

## 2018-02-14 PROCEDURE — 63600175 PHARM REV CODE 636 W HCPCS: Performed by: HOSPITALIST

## 2018-02-14 PROCEDURE — 84100 ASSAY OF PHOSPHORUS: CPT

## 2018-02-14 PROCEDURE — 93306 TTE W/DOPPLER COMPLETE: CPT

## 2018-02-14 PROCEDURE — 25000003 PHARM REV CODE 250: Performed by: NURSE PRACTITIONER

## 2018-02-14 PROCEDURE — 85025 COMPLETE CBC W/AUTO DIFF WBC: CPT

## 2018-02-14 PROCEDURE — 80053 COMPREHEN METABOLIC PANEL: CPT

## 2018-02-14 PROCEDURE — 25000003 PHARM REV CODE 250: Performed by: HOSPITALIST

## 2018-02-14 PROCEDURE — 99233 SBSQ HOSP IP/OBS HIGH 50: CPT | Mod: ,,, | Performed by: HOSPITALIST

## 2018-02-14 PROCEDURE — 83735 ASSAY OF MAGNESIUM: CPT

## 2018-02-14 RX ORDER — MAGNESIUM SULFATE HEPTAHYDRATE 40 MG/ML
2 INJECTION, SOLUTION INTRAVENOUS ONCE
Status: DISCONTINUED | OUTPATIENT
Start: 2018-02-14 | End: 2018-02-14

## 2018-02-14 RX ORDER — FUROSEMIDE 10 MG/ML
60 INJECTION INTRAMUSCULAR; INTRAVENOUS 3 TIMES DAILY
Status: DISCONTINUED | OUTPATIENT
Start: 2018-02-14 | End: 2018-02-16

## 2018-02-14 RX ADMIN — PANTOPRAZOLE SODIUM 40 MG: 40 TABLET, DELAYED RELEASE ORAL at 08:02

## 2018-02-14 RX ADMIN — DOXYCYCLINE HYCLATE 100 MG: 100 TABLET, COATED ORAL at 09:02

## 2018-02-14 RX ADMIN — FUROSEMIDE 40 MG: 10 INJECTION, SOLUTION INTRAMUSCULAR; INTRAVENOUS at 08:02

## 2018-02-14 RX ADMIN — ATORVASTATIN CALCIUM 80 MG: 20 TABLET, FILM COATED ORAL at 08:02

## 2018-02-14 RX ADMIN — Medication 1 CAPSULE: at 09:02

## 2018-02-14 RX ADMIN — FUROSEMIDE 60 MG: 10 INJECTION, SOLUTION INTRAMUSCULAR; INTRAVENOUS at 09:02

## 2018-02-14 RX ADMIN — ACETAMINOPHEN 650 MG: 325 TABLET ORAL at 09:02

## 2018-02-14 RX ADMIN — MAGNESIUM SULFATE HEPTAHYDRATE 2 G: 500 INJECTION, SOLUTION INTRAMUSCULAR; INTRAVENOUS at 02:02

## 2018-02-14 RX ADMIN — FUROSEMIDE 60 MG: 10 INJECTION, SOLUTION INTRAMUSCULAR; INTRAVENOUS at 03:02

## 2018-02-14 RX ADMIN — SERTRALINE HYDROCHLORIDE 25 MG: 25 TABLET ORAL at 08:02

## 2018-02-14 RX ADMIN — CLOPIDOGREL 75 MG: 75 TABLET, FILM COATED ORAL at 08:02

## 2018-02-14 RX ADMIN — DOXYCYCLINE HYCLATE 100 MG: 100 TABLET, COATED ORAL at 08:02

## 2018-02-14 RX ADMIN — ASPIRIN 81 MG: 81 TABLET, COATED ORAL at 08:02

## 2018-02-14 RX ADMIN — HEPARIN SODIUM 5000 UNITS: 5000 INJECTION, SOLUTION INTRAVENOUS; SUBCUTANEOUS at 05:02

## 2018-02-14 RX ADMIN — HEPARIN SODIUM 5000 UNITS: 5000 INJECTION, SOLUTION INTRAVENOUS; SUBCUTANEOUS at 02:02

## 2018-02-14 RX ADMIN — LISINOPRIL 20 MG: 20 TABLET ORAL at 08:02

## 2018-02-14 NOTE — CONSULTS
"[facility]  Interventional Cardiology  Consult Note    Patient Name: Delmi Nunez  MRN: [id number]  Admission Date: 2/12/2018  Hospital Length of Stay: 2 days  Code Status: Full Code   Attending Provider: Gisela Cobos MD  Consulting Provider: Kiara Melendez PA-C  Primary Care Physician: Primary Doctor No  Principal Problem:Acute on chronic congestive heart failure    Patient information was obtained from patient, spouse/SO, past medical records and ER records.     Inpatient consult to Interventional Cardiology  Consult performed by: KIARA MELENDEZ  Consult ordered by: GISELA COBOS        Subjective:     Referring: Dr. Minor    HPI:  Mrs. Nunez is a pleasant 82 yo lady scheduled to see us in clinic on 2/26. We recently evaluated her , Papa, for TAVR but he does not qualify at this time. 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 She states she felt "great" after her initial valve replacement/CABG in 2003 -- she was very active around the house and could do all of her activities without difficulty. About 5-6 months ago, she began to develop AVILES. She was admitted to  in September with decompensated heart failure and anemia. She was transfused, diuresed, and discharged home. She was admitted to [facility] again with decompensated heart failure and discharged on 1/29/18 with oxygen 2L/NC.   She had noted no improvement in her AVILES since her discharge 2 weeks ago. She has PND, orthopnea, and LE edema. She becomes SOB ambulating to her bathroom in the house.     Since admission, her SOB and LE edema have " improved, though she continues to have orthopnea and can not lie flat.     LHC in Nov 2017 showed widely patent SVG to RCA and otherwise non-obstructive CAD. IVAN on 11/2017 showed moderate perivalvular leak. She was subsequently referred for consideration of PVL closure. She was scheduled for IVAN on 2/16 and clinic appointment on 2/26.     Past Medical History:   Diagnosis Date    Abdominal aneurysm     Anemia     Bacterial meningitis     Cancer     lung cancer    CHF (congestive heart failure)     Coronary artery disease     Hyperlipidemia     Hypertension        Past Surgical History:   Procedure Laterality Date    CARDIAC PACEMAKER PLACEMENT      CORONARY ARTERY BYPASS GRAFT      EYE SURGERY      LUNG REMOVAL, PARTIAL Left        Review of patient's allergies indicates:   Allergen Reactions    Codeine        PTA Medications   Medication Sig    aspirin (ECOTRIN) 81 MG EC tablet Take 81 mg by mouth once daily.    atorvastatin (LIPITOR) 80 MG tablet Take 80 mg by mouth once daily.    clopidogrel (PLAVIX) 75 mg tablet Take 75 mg by mouth once daily.    FOLIC ACID/MULTIVIT-MIN/LUTEIN (CENTRUM SILVER ORAL) Take by mouth.    furosemide (LASIX) 40 MG tablet Take 40 mg by mouth once daily.    lisinopril (PRINIVIL,ZESTRIL) 20 MG tablet Take 20 mg by mouth once daily.    omega 3-dha-epa-fish oil (FISH OIL) 100-160-1,000 mg Cap Take by mouth.    omeprazole (PRILOSEC) 20 MG capsule Take 20 mg by mouth once daily.    sertraline (ZOLOFT) 25 MG tablet Take 25 mg by mouth once daily.    meclizine (ANTIVERT) 32 MG tablet Take 32 mg by mouth 3 (three) times daily as needed.     Family History     Problem Relation (Age of Onset)    Diabetes Mother    Heart failure Mother    Intracerebral hemorrhage Father        Social History Main Topics    Smoking status: Former Smoker    Smokeless tobacco: Never Used    Alcohol use No    Drug use: No    Sexual activity: Not on file     Review of Systems   Constitution:  Negative for chills, diaphoresis, fever, weakness, weight gain and weight loss.   HENT: Negative for sore throat.    Eyes: Negative for blurred vision, vision loss in left eye, vision loss in right eye and visual disturbance.   Cardiovascular: Positive for dyspnea on exertion, leg swelling, orthopnea and paroxysmal nocturnal dyspnea. Negative for chest pain, claudication, near-syncope, palpitations and syncope.   Respiratory: Negative for cough, hemoptysis, shortness of breath, sputum production and wheezing.    Endocrine: Negative for cold intolerance and heat intolerance.   Hematologic/Lymphatic: Negative for adenopathy. Does not bruise/bleed easily.   Skin: Negative for rash.        L breast swelling/redness   Musculoskeletal: Negative for falls, muscle weakness and myalgias.   Gastrointestinal: Negative for abdominal pain, change in bowel habit, constipation, diarrhea, melena and nausea.   Genitourinary: Negative for bladder incontinence.   Neurological: Negative for dizziness, focal weakness, headaches, light-headedness and numbness.   Psychiatric/Behavioral: Negative for altered mental status.     Objective:     Vital Signs (Most Recent):  Temp: 97.9 °F (36.6 °C) (02/14/18 0833)  Pulse: 60 (02/14/18 1206)  Resp: 20 (02/14/18 1206)  BP: 135/71 (02/14/18 1206)  SpO2: 100 % (02/14/18 1206) Vital Signs (24h Range):  Temp:  [97.5 °F (36.4 °C)-98 °F (36.7 °C)] 97.9 °F (36.6 °C)  Pulse:  [59-94] 60  Resp:  [16-24] 20  SpO2:  [90 %-100 %] 100 %  BP: (104-144)/(55-74) 135/71     Weight: 60.1 kg (132 lb 7.9 oz)  Body mass index is 22.74 kg/m².    SpO2: 100 %  O2 Device (Oxygen Therapy): nasal cannula      Intake/Output Summary (Last 24 hours) at 02/14/18 1341  Last data filed at 02/14/18 0600   Gross per 24 hour   Intake              550 ml   Output             1175 ml   Net             -625 ml       Lines/Drains/Airways     Peripheral Intravenous Line                 Peripheral IV - Single Lumen 02/12/18 1754 Left  Wrist 1 day                Physical Exam   Constitutional: She is oriented to person, place, and time. She appears well-developed and well-nourished. No distress.   HENT:   Head: Normocephalic and atraumatic.   Mouth/Throat: Oropharynx is clear and moist.   Eyes: Conjunctivae and EOM are normal. Pupils are equal, round, and reactive to light. No scleral icterus.   Neck: Neck supple. No JVD present. No tracheal deviation present.   Cardiovascular: Normal rate and regular rhythm.  Exam reveals no gallop and no friction rub.    Murmur heard.   Holosystolic murmur is present with a grade of 3/6  at the upper right sternal border radiating to the neck  Pulmonary/Chest: Effort normal and breath sounds normal. No respiratory distress. She has no wheezes. She has no rales. She exhibits no tenderness.   Abdominal: Soft. Bowel sounds are normal. She exhibits no distension. There is no hepatosplenomegaly. There is no tenderness.   Musculoskeletal: She exhibits no edema or tenderness.   Neurological: She is alert and oriented to person, place, and time.   Skin: Skin is warm and dry. No rash noted. No erythema.   Psychiatric: She has a normal mood and affect. Her behavior is normal.       Significant Labs:   CMP   Recent Labs  Lab 02/12/18  1754 02/13/18  0648 02/14/18  0710    143 142   K 4.5 4.2 3.9    106 105   CO2 24 29 27   * 137* 150*   BUN 41* 40* 39*   CREATININE 1.2 1.3 1.3   CALCIUM 9.2 10.1 9.3   PROT 6.2 5.9* 5.6*   ALBUMIN 3.1* 3.0* 2.8*   BILITOT 1.1* 1.1* 1.0   ALKPHOS 76 72 76   AST 76* 62* 48*   * 120* 103*   ANIONGAP 10 8 10   ESTGFRAFRICA 49.0* 44.5* 44.5*   EGFRNONAA 42.5* 38.6* 38.6*    and CBC   Recent Labs  Lab 02/12/18  1754 02/13/18  0648 02/14/18  0710   WBC 7.97 6.95 7.87   HGB 9.9* 9.6* 9.3*   HCT 32.4* 31.3* 30.9*    214 209         Assessment and Plan:     * Acute on chronic congestive heart failure    Diuresing with IV Lasix 40mg BID.  LFTs trending down.           "  Atherosclerosis of aorta    Seen on outpatient IVAN (in Media tab).   On ASA and statin.         History of lung cancer    Follows with Dr. Berrios at .   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   Hx of multiple lung nodules.   PET in July 2017 shows "increase in RUL nodular lesion from 1.1 cm to 1.4 cm with increased SUV 8, increased R apical nodule from 1 cm to 1.5 cm without SUV uptake. There is also nodular focuse of increased SUV activity posterior to the descending aorta about 3.7."  Per Dr. Berrios's note (in Media Tab): "right upper lobe mass increase can be due to ongoing fibrosis (she has had 2 biopsies which are negative for malignancy). This should not impede her aortic valve repair. She will receive a chest CT in 3 months, even if she has a cancer in the RUL despite having 2 negative biopsies, she is still a candidate for stereotactic radiation surgery."        Pacemaker    Placed at time of AVR + CABG in 2003.   Stable.         Acute on chronic respiratory failure with hypoxemia    On Home O2 prn, started 1/29/18.   Currently on 2L.   Wean as tolerated.         Protein calorie malnutrition    Albumin 2.8.   Nutrition following.         Erythema of breast    On doxycycline.         Anemia    H/H stable.   Hx of anemia requiring transfusion in Sept 2017.   Needs hemolysis work-up given ?PVL.         Hyperlipemia    Stable, on statin.         Essential hypertension    Controlled on lisinopril.         CAD (coronary artery disease)    S/p single vessel CABG at the time of AVR in 2003.   Patent graft by [facility] in Nov 2017. Otherwise non-obstructive CAD.  On ASA and statin. No b-blocker for unknown reason -- will defer to primary cardiologist.             VTE Risk Mitigation         Ordered     heparin (porcine) injection 5,000 Units  Every 8 hours     Route:  Subcutaneous        02/13/18 1104     Medium Risk of VTE  Once      02/12/18 2216    "  Place sequential compression device  Until discontinued      02/12/18 3321        Continue diuresis.   Will follow up results of TTE and IVAN.     Thank you for your consult. I will follow-up with patient. Please contact us if you have any additional questions.    Zara Melendez PA-C  Interventional Cardiology   [facility]  [phone]

## 2018-02-14 NOTE — ASSESSMENT & PLAN NOTE
H/H stable.   Hx of anemia requiring transfusion in Sept 2017.   Needs hemolysis work-up given ?PVL.

## 2018-02-14 NOTE — PLAN OF CARE
Problem: Patient Care Overview  Goal: Plan of Care Review  Outcome: Ongoing (interventions implemented as appropriate)  Plan of care reviewed with patient and spouse at bedside. Continuing to diurese with IVP Lasix. 2DE and breast U/S ordered. Plan for IVAN while inpatient to eval for AV leak. Patient remains free from trauma, fall, injury this shift. Patient tolerating plan of care well. Will continue to monitor.

## 2018-02-14 NOTE — PLAN OF CARE
Pt seen/evaluated  Full note to follow    Acute on chronic CHF (details unknown) - ?Paravalvular leak around AVR (bovine '03)?  - Diuresis with Lasix 40 IV BID for now  - 2D Echo with CFD  - Interventional/Valve consult tomorrow    Weight loss 30 lbs x 3-6 months. Unintentional. Decreased appetite.     H/o lung cancer - surveillance done by MD at [facility], new small bilateral nodules per family (Bx-ed x 2, negative)  - review outside records    L breast erythema/edema - ?peau d'orange -   - no recent mammogram  - Tx empirically for cellulitis with doxy 100 PO BID for now  - U/S breast to eval abscess/other  - may need mammogram ASAP     L foot ecchymosis - unclear etiology - no known trauma. Improved from prior (per comparison with photos that family showed me)  - monitor    CM to assist with outside records (all records at [facility])

## 2018-02-14 NOTE — PLAN OF CARE
Faxed release of information to [facility] medical records, patient's family stated that Dr. Abdulkadir Cobos already has all medical records from [facility], notified Dr. Gisela Cobos, will continue to follow.

## 2018-02-14 NOTE — PLAN OF CARE
Primary Doctor No     Payor: [employer] MANAGED MEDICARE / Plan: [employer] MEDICARE HMO / Product Type: Capitation /      Extended Emergency Contact Information  Primary Emergency Contact: Papa Nunez  Address: [address] [place] [address] [facility]  Home Phone: [phone]  Mobile Phone: [phone]  Relation: Spouse  Secondary Emergency Contact: Mamie Nunez  Address: [address] [place] [address] [facility]  Home Phone: [phone]  Mobile Phone: [phone]  Relation: Daughter        02/14/18 1300   Discharge Assessment   Assessment Type Discharge Planning Assessment   Confirmed/corrected address and phone number on facesheet? Yes   Assessment information obtained from? Patient   Expected Length of Stay (days) 4   Communicated expected length of stay with patient/caregiver yes   Prior to hospitilization cognitive status: Alert/Oriented   Prior to hospitalization functional status: Independent   Current cognitive status: Alert/Oriented   Current Functional Status: Needs Assistance   Lives With spouse   Able to Return to Prior Arrangements yes   Is patient able to care for self after discharge? Yes   Patient's perception of discharge disposition home or selfcare;home health   Readmission Within The Last 30 Days no previous admission in last 30 days   Patient currently being followed by outpatient case management? No   Patient currently receives any other outside agency services? No   Equipment Currently Used at Home none   Do you have any problems affording any of your prescribed medications? No   Is the patient taking medications as prescribed? yes   Does the patient have transportation home? Yes   Transportation Available family or friend will provide   Does the patient receive services at the [facility]mad[facility] Clinic? No   Discharge Plan A Home with family;Home Health   Discharge Plan B Home with family   Patient/Family In Agreement With Plan  yes

## 2018-02-14 NOTE — PT/OT/SLP EVAL
Occupational Therapy   Evaluation and Discharge Note    Name: Delmi Nunez  MRN: [id number]  Admitting Diagnosis:  Acute on chronic congestive heart failure      Recommendations:     Discharge Recommendations: home  Discharge Equipment Recommendations:  none  Barriers to discharge:  None    History:     Occupational Profile:  Living Environment: Pt lives with spouse in a H with 0STE. Bathroom has a tub/shower combo without any seat/bench.   Previous level of function: PTA, pt was independent with self-care and functional mobility, was driving, and completing housework (cleaning/cooking).  Roles and Routines: Pt is a wife and a mother.   Equipment Owned:  none  Assistance upon Discharge: Pt's  and daughter will be able to assist as needed at d/c.    Past Medical History:   Diagnosis Date    Abdominal aneurysm     Anemia     Bacterial meningitis     Cancer     lung cancer    CHF (congestive heart failure)     Coronary artery disease     Hyperlipidemia     Hypertension        Past Surgical History:   Procedure Laterality Date    CARDIAC PACEMAKER PLACEMENT      CORONARY ARTERY BYPASS GRAFT      EYE SURGERY      LUNG REMOVAL, PARTIAL Left        Subjective     Chief Complaint: swelling and discomfort in BLE's  Patient/Family stated goals: return home  Communicated with: RN william) prior to session.  Pain/Comfort:  · Pain Rating 1: 0/10  · Pain Rating Post-Intervention 1: 0/10    Patients cultural, spiritual, Lutheran conflicts given the current situation: none reported     Objective:     Patient found with: pulse ox (continuous), telemetry    General Precautions: Standard, fall, NPO   Orthopedic Precautions:N/A   Braces: N/A     Occupational Performance:    Bed Mobility:    · Unable to assess as pt found seated at EOB at start of session, however pt reporting she did not need physical assistance to attain sitting EOB position     Functional Mobility/Transfers:  · Patient completed Sit <>  Stand Transfer with stand by assistance  with  no assistive device from EOB with bed in lowest position with some instability first time standing d/t BLE swelling  · Patient completed Toilet Transfer functional mobility technique with supervision with  no AD   · Functional Mobility: Pt engaging in functional mobility to simulate household distances from bed to bathroom, then approx 100 ft with SBA and without any noted LOB or instability. Pt reporting difficulty walking at baseline d/t BLE swelling.    Activities of Daily Living:  · Feeding:  independence (simulated; pt noted with ability to access face with BUE's)  · Grooming: supervision (to stand at sink to wash hands after toileting without UE support and without any noted LOB)  · Bathing: activity did not occur     · UB Dressing: activity did not occur, however pt with full UE ROM to complete task     · LB Dressing: stand by assistance pt reporting she does not wear socks at home and requiring SBA for standing balance  · Toileting: supervision (pt able to manage clothing and complete toileting hygiene without assistance)    Cognitive/Visual Perceptual:  Cognitive/Psychosocial Skills:     -       Oriented to: Person, Place, Time and Situation   -       Follows Commands/attention:Follows two-step commands and Follows multistep  commands  -       Communication: clear/fluent  -       Memory: No Deficits noted  -       Safety awareness/insight to disability: intact   -       Mood/Affect/Coping skills/emotional control: Appropriate to situation  Visual/Perceptual:      -Intact    Physical Exam:  Balance:    -       pt independent sitting at EOB for formal assessment without any UE support or LOB; pt requiring SBA during functional mobility 2/2 BLE swelling and discomfort  Postural examination/scapula alignment:    -       No postural abnormalities identified  Skin integrity: Visible skin intact  Edema:  Mild BLE's (non-pitting)  Sensation: -       Intact  Upper  "Extremity Range of Motion:     -       Right Upper Extremity: WFL  -       Left Upper Extremity: WFL  Upper Extremity Strength:    -       Right Upper Extremity: Deficits: 3+/5  -       Left Upper Extremity: Deficits: 3+/5   Strength:    -       Right Upper Extremity: WFL  -       Left Upper Extremity: WFL  Fine Motor Coordination:    -       Intact  Gross motor coordination:   WFL    Patient left seated at EOB with all lines intact, call button in reach, RN notified and spouse present    [facility] 6 Click:  [facility] Total Score: 24    Treatment & Education:  Pt seated at EOB during start of session with spouse in the room during second half of session; pt reporting discomfort and difficulty with initially standing d/t BLE weakness; formal assessment completed with patient seated EOB x4 minutes with independence, no UE support, and no noted LOB; sit>stand from EOB with SBA requiring increased time to maintain standing position prior to functional mobility; functional mobility from bed to bathroom with SBA and no Ad; toilet t/f with supervision without utilizing grab bars; grooming while standing at sink for approx 10 seconds with supervision and no noted LOB; functional mobility in the hallway to simulate household distances approx 100 ft without the use of any AD and with SBA 2/2 increased discomfort in BLEs; pt requesting to sit at EOB at end of session    Additional:  Communicated role of OT/POC-pt reports she is close to her functional baseline and OT discussed no further need for acute OT at this time  Updated communication board  RN notified post-OT session  Education:    Assessment:     Delmi Nunez is a 83 y.o. female with a medical diagnosis of Acute on chronic congestive heart failure. At this time, patient is functioning at their prior level of function and does not require further acute OT services.     Clinical Decision Makin.  OT Low:  "Pt evaluation falls under low complexity for " "evaluation coding due to performance deficits noted in 1-3 areas as stated above and 0 co-morbities affecting current functional status. Data obtained from problem focused assessments. No modifications or assistance was required for completion of evaluation. Only brief occupational profile and history review completed."     Plan:     During this hospitalization, patient does not require further acute OT services.  Please re-consult if situation changes.    · Plan of Care Reviewed with: patient, spouse    This Plan of care has been discussed with the patient who was involved in its development and understands and is in agreement with the identified goals and treatment plan    GOALS:    Occupational Therapy Goals     Not on file          Multidisciplinary Problems (Resolved)        Problem: Occupational Therapy Goal    Goal Priority Disciplines Outcome Interventions   Occupational Therapy Goal   (Resolved)     OT, PT/OT Outcome(s) achieved                    Time Tracking:     OT Date of Treatment: 02/14/18  OT Start Time: 1340  OT Stop Time: 1359  OT Total Time (min): 19 min    Billable Minutes:Evaluation 19 minutes    Michaela Hoskins OT  2/14/2018    "

## 2018-02-14 NOTE — HPI
"Mrs. Nunez is a pleasant 84 yo lady scheduled to see us in clinic on 2/26. We recently evaluated her , Papa, for TAVR but he does not qualify at this time. 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 She states she felt "great" after her initial valve replacement/CABG in 2003 -- she was very active around the house and could do all of her activities without difficulty. About 5-6 months ago, she began to develop AVILES. She was admitted to  in September with decompensated heart failure and anemia. She was transfused, diuresed, and discharged home. She was admitted to [facility] again with decompensated heart failure and discharged on 1/29/18 with oxygen 2L/NC.  She had noted no improvement in her AVILES since her discharge 2 weeks ago. She has PND, orthopnea, and LE edema. She becomes SOB ambulating to her bathroom in the house.     Since admission, her SOB and LE edema have improved, though she continues to have orthopnea and can not lie flat.     LHC in Nov 2017 showed widely patent SVG to RCA and otherwise non-obstructive CAD. IVAN on 11/2017 showed moderate perivalvular leak. She was subsequently referred for consideration of PVL closure. She was scheduled for IVAN on 2/16 and clinic appointment on 2/26.   "

## 2018-02-14 NOTE — PROGRESS NOTES
Progress Note   [facility] Medicine - [facility] [place]       Team: [facility] [place] [facility]OSP MED C Gisela Cobos MD  Admit Date: 2/12/2018  Length of Stay:  LOS: 2 days   LUISITO 2/16/2018  Principal Problem:  Acute on chronic congestive heart failure    HPI:  Mrs. Nunez is a pleasant 84yo  female with history of CHF (unknown details), chronic resp failure on home O2 2L x 2 weeks, HTN, CAD (?details), HTN, HLD, AAA (?details), h/o lung cancer (adenocarcinoma, ?carcinoid) s/p wedge and partial lobectomy '15, and anemia who presented to ED with shortness of breath with activity and at rest and B LE edema.  She was recently admitted to [facility] for same complaints and was discharged on 1/29/18 with home oxygen 2L/NC.  She states her Lasix has recently been increased from 20mg once daily to 40mg once daily within last week. She denies CP, cough, fever, chills, headache, and sputum production. She also reports redness of left breast and bruising of her left foot x 1 day.  She states she noticed these this morning when she woke up.  She denies numbness, pain, or trauma to either area; denies discharge from left breast    Hospital Course: Ad[place] to [facility] [place], started on Lasix 40 IV BID. net -780 cc x 12 hours, continues with AVILES, consult Valve team tomorrow    Interval hx / overnight events: net -625cc, weight unchanged, increase Lasix to 60 IV TID. Valve team consulted. 2D Echo with CFD CONCLUSIONS     1 - Upper limit of normal left ventricular enlargement.     2 - Normal left ventricular systolic function (EF 60-65%).     3 - Eccentric hypertrophy.     4 - Wall motion abnormalities.     5 - Right ventricle is upper limit of normal in size with not well seen systolic function.     6 - Biatrial enlargement.     7 - Moderate to severe aortic stenosis, HILARY = 0.92 cm2, AVAi = 0.56 cm2/m2, peak velocity = 4.21 m/s, mean gradient = 46 mmHg.     8 - Mild aortic regurgitation.     9 - Moderate to severe mitral regurgitation.     10 - Mild tricuspid  regurgitation.     11 - Increased central venous pressure.     12 - Pulmonary hypertension. The estimated PA systolic pressure is 49 mmHg.     Areas of concern/ handoff: none    ROS:  Pain Scale: 0 /10   Constitutional: no fever or chills  Respiratory: no cough or shortness of breath  Cardiovascular: no chest pain or palpitations  Gastrointestinal: no nausea or vomiting, no abdominal pain or change in bowel habits  Genitourinary: no hematuria or dysuria  Integument/Breast: no rash or pruritis  Hematologic/Lymphatic: no easy bruising or lymphadenopathy  Musculoskeletal: no arthralgias or myalgias  Neurological: no seizures or tremors  Behavioral/Psych: no depression or anxiety      Vital Signs Range (Last 24H):  Temp:  [97.5 °F (36.4 °C)-98 °F (36.7 °C)]   Pulse:  [59-94]   Resp:  [16-24]   BP: (104-144)/(55-74)   SpO2:  [90 %-100 %]     I & O (Last 24H):    Intake/Output Summary (Last 24 hours) at 02/14/18 1317  Last data filed at 02/14/18 0600   Gross per 24 hour   Intake              550 ml   Output             1175 ml   Net             -625 ml       Physical Exam:  General appearance: no distress, pale, elderly, well-groomed  Mental status: Alert and oriented x 3, pleasant  HEENT:  conjunctivae/corneas clear, PERRL  Neck: supple, thyroid not enlarged, +JVD  Pulm:   normal respiratory effort, CTA B, no c/w/r  Card: RRR, S1, S2 normal, +systolic murmur, no click, rub or gallop  Abd: soft, NT, ND, BS present; no masses, no organomegaly  Ext: no c/c/e  Pulses: 2+, symmetric  Skin: color, texture, turgor normal. No rashes or lesions  Neuro: CN II-XII grossly intact, no focal numbness or weakness, normal strength and tone     Diagnostic Results:  Labs reviewed    Recent Results (from the past 24 hour(s))   Comprehensive Metabolic Panel (CMP)    Collection Time: 02/14/18  7:10 AM   Result Value Ref Range    Sodium 142 136 - 145 mmol/L    Potassium 3.9 3.5 - 5.1 mmol/L    Chloride 105 95 - 110 mmol/L    CO2 27 23 - 29  mmol/L    Glucose 150 (H) 70 - 110 mg/dL    BUN, Bld 39 (H) 8 - 23 mg/dL    Creatinine 1.3 0.5 - 1.4 mg/dL    Calcium 9.3 8.7 - 10.5 mg/dL    Total Protein 5.6 (L) 6.0 - 8.4 g/dL    Albumin 2.8 (L) 3.5 - 5.2 g/dL    Total Bilirubin 1.0 0.1 - 1.0 mg/dL    Alkaline Phosphatase 76 55 - 135 U/L    AST 48 (H) 10 - 40 U/L     (H) 10 - 44 U/L    Anion Gap 10 8 - 16 mmol/L    eGFR if African American 44.5 (A) >60 mL/min/1.73 m^2    eGFR if non  38.6 (A) >60 mL/min/1.73 m^2   Magnesium    Collection Time: 02/14/18  7:10 AM   Result Value Ref Range    Magnesium 1.7 1.6 - 2.6 mg/dL   Phosphorus    Collection Time: 02/14/18  7:10 AM   Result Value Ref Range    Phosphorus 3.2 2.7 - 4.5 mg/dL   CBC with Automated Differential    Collection Time: 02/14/18  7:10 AM   Result Value Ref Range    WBC 7.87 3.90 - 12.70 K/uL    RBC 3.29 (L) 4.00 - 5.40 M/uL    Hemoglobin 9.3 (L) 12.0 - 16.0 g/dL    Hematocrit 30.9 (L) 37.0 - 48.5 %    MCV 94 82 - 98 fL    MCH 28.3 27.0 - 31.0 pg    MCHC 30.1 (L) 32.0 - 36.0 g/dL    RDW 24.8 (H) 11.5 - 14.5 %    Platelets 209 150 - 350 K/uL    MPV 11.8 9.2 - 12.9 fL    Immature Granulocytes 0.5 0.0 - 0.5 %    Gran # (ANC) 6.7 1.8 - 7.7 K/uL    Immature Grans (Abs) 0.04 0.00 - 0.04 K/uL    Lymph # 0.4 (L) 1.0 - 4.8 K/uL    Mono # 0.7 0.3 - 1.0 K/uL    Eos # 0.1 0.0 - 0.5 K/uL    Baso # 0.05 0.00 - 0.20 K/uL    nRBC 0 0 /100 WBC    Gran% 85.0 (H) 38.0 - 73.0 %    Lymph% 4.6 (L) 18.0 - 48.0 %    Mono% 8.3 4.0 - 15.0 %    Eosinophil% 1.0 0.0 - 8.0 %    Basophil% 0.6 0.0 - 1.9 %    Aniso Slight     Poik Slight     Poly Occasional     Hypo Occasional     Ovalocytes Occasional     Verito Cells Occasional     Differential Method Automated    Occult blood x 1, stool    Collection Time: 02/14/18 10:16 AM   Result Value Ref Range    Occult Blood Negative Negative       No results for input(s): POCTGLUCOSE in the last 168 hours.       aspirin  81 mg Oral Daily    atorvastatin  80 mg Oral Daily     clopidogrel  75 mg Oral Daily    doxycycline  100 mg Oral Q12H    furosemide  40 mg Intravenous BID    heparin (porcine)  5,000 Units Subcutaneous Q8H    lisinopril  20 mg Oral Daily    omega-3 fatty acids-fish oil  1 capsule Oral Daily    pantoprazole  40 mg Oral Daily    sertraline  25 mg Oral Daily       Assessment and Plan     Active Hospital Problems    Diagnosis  POA    *Acute on chronic congestive heart failure [I50.9]  Yes    Protein calorie malnutrition [E46]  Yes    Elevated LFTs [R79.89]  Yes    Acute on chronic respiratory failure with hypoxemia [J96.21]  Yes    Mild malnutrition [E44.1]  Yes    Pacemaker [Z95.0]  Yes     '03 for heart block      History of lung cancer [Z85.118]  Not Applicable     Pulm: Dr Berrios at [facility]      S/P AVR (aortic valve replacement) [Z95.2]  Not Applicable     [place]ovine '03      Weight loss [R63.4]  Yes    CAD (coronary artery disease) [I25.10]  Yes    Essential hypertension [I10]  Yes    Anemia [D64.9]  Yes    Erythema of breast [L53.9]  Yes      Resolved Hospital Problems    Diagnosis Date Resolved POA   No resolved problems to display.       Problems Addressed today:    Acute on chronic systolic CHF  Severe AS  Chronic hypoxemic respiratory failure on home O2 2L  - no prior records on file - previously received care at   - Lasix 60 IV TID  - Continue oxygen 2L/NC and wean as tolerated to maintain saturations greater than 92%  - daily wt's  - strict I/O's   - 1.5L fluid restriction while inpt   - maintain on telemetry  - keep Mg >2, K >4  - IVAN   - NPO after midnight in case she can do IVAN tomorrow (if able to lay flat)  - f/u Valve/Interventional cardiology recs, appreciate consult    L breast erythema - DDx cellulitis, other. ?peau d'orange. Negative fever and tenderness. WBC wnl but with 82% granulocytes  - f/u Ultrasound of breast  - trial of Doxy 100 PO BID  - needs outpt mammogram ASAP (unable to obtain mammogram as inpatient)    Essential  HTN  - home Lisinopril 20  - NO BB for now (not on BB at home and may need to give positive inotropic support)    CAD - ?details  - troponin elevated 0.413, denies CP, EKG without obvious ischemic changes, likely related to worsening cardiac function, trend Q 6 x3    Transaminitis - likely congestive hepatopathy  - monitor for improvement after diuresis  - daily CMP  - if no improvement, abd US    Abnormal thyroid labs - TSH slightly elevated but FT4 wnl  - re-check TFTs when out of acute setting    Anemia of acute on chronic illness. Unclear etiology - had EGD and C-scope at [facility] with no source found; VCE not done. No BPR, no other blood loss. Recent transfusion of 2u pRBCs at [facility] 1/25. Fe studies unremarkable (ferritin wnl, TIBC wnl) except Fe sat low at 14%  - check retic count  - outpt Hematology eval, may need BMBx    Elevated A1C - 6.3 - prediabetes  -  on diet and lifestyle modifications    Weight loss - 30 pounds in 3-6 months. Prior weight 157 --> 128 (dry weight)    HypoMg - replace IV    Mild malnutrition - Alb 3.0. Prealbumin wnl.  - f/u nutrition consult  - start MVI    Deconditioning   - high risk of falls; utilize all safety measures and fall precautions   - PT/OT    PPX: SCDs; hep 5K q8 - hold after tonight's dose (for IVAN tomorrow), then resume tomorrow after IVAN    Goals of Care/Advanced Directives: full code    Disposition/Post-Acute Care: pending clinical condition, likely home with no services    Time spent in care of the patient (Greater than 1/2 spent in direct face-to-face contact) 40 minutes    Gisela Cobos MD

## 2018-02-14 NOTE — ASSESSMENT & PLAN NOTE
"Follows with Dr. Berrios at .   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   Hx of multiple lung nodules.   PET in July 2017 shows "increase in RUL nodular lesion from 1.1 cm to 1.4 cm with increased SUV 8, increased R apical nodule from 1 cm to 1.5 cm without SUV uptake. There is also nodular focuse of increased SUV activity posterior to the descending aorta about 3.7."  Per Dr. Berrios's note (in Media Tab): "right upper lobe mass increase can be due to ongoing fibrosis (she has had 2 biopsies which are negative for malignancy). This should not impede her aortic valve repair. She will receive a chest CT in 3 months, even if she has a cancer in the RUL despite having 2 negative biopsies, she is still a candidate for stereotactic radiation surgery."  "

## 2018-02-14 NOTE — ANESTHESIA PREPROCEDURE EVALUATION
[facility]  Anesthesia Pre-Operative Evaluation         Patient Name: Delmi Nunez  YOB: 1935  MRN: [id number]    SUBJECTIVE:     Pre-operative evaluation for Procedure(s) (LRB):  TRANSESOPHAGEAL ECHOCARDIOGRAM (IVAN) (N/A)     02/14/2018    Delmi Nunez is a 83 y.o. female w/ a significant PMHx of history of CHF (unknown details; 2D and IVAN ordered) with pacemaker, chronic resp failure on home O2 2L, HTN, CAD, HTN, HLD, AAA, h/o lung cancer (adenocarcinoma, ?carcinoid) s/p wedge and partial lobectomy '15, and anemia who presented to ED with shortness of breath with activity and at rest and B LE edema and who presents for the above procedure.      LDA: N  18G L wrist     Prev airway: None documented.     Drips: None documented.    Patient Active Problem List   Diagnosis    Acute on chronic congestive heart failure    CAD (coronary artery disease)    Essential hypertension    Hyperlipemia    Anemia    Erythema of breast    Protein calorie malnutrition    Elevated LFTs    Acute on chronic respiratory failure with hypoxemia    Mild malnutrition    Pacemaker    History of lung cancer    S/P AVR (aortic valve replacement)    Weight loss       Review of patient's allergies indicates:   Allergen Reactions    Codeine        Current Inpatient Medications:   aspirin  81 mg Oral Daily    atorvastatin  80 mg Oral Daily    clopidogrel  75 mg Oral Daily    doxycycline  100 mg Oral Q12H    furosemide  40 mg Intravenous BID    heparin (porcine)  5,000 Units Subcutaneous Q8H    lisinopril  20 mg Oral Daily    magnesium sulfate IVPB  2 g Intravenous Once    omega-3 fatty acids-fish oil  1 capsule Oral Daily    pantoprazole  40 mg Oral Daily    sertraline  25 mg Oral Daily       No current facility-administered medications on file prior to encounter.      No current outpatient prescriptions on file prior to encounter.       Past Surgical History:   Procedure  Laterality Date    CARDIAC PACEMAKER PLACEMENT      CORONARY ARTERY BYPASS GRAFT      EYE SURGERY      LUNG REMOVAL, PARTIAL Left        Social History     Social History    Marital status:      Spouse name: N/A    Number of children: N/A    Years of education: N/A     Occupational History    Not on file.     Social History Main Topics    Smoking status: Former Smoker    Smokeless tobacco: Never Used    Alcohol use No    Drug use: No    Sexual activity: Not on file     Other Topics Concern    Not on file     Social History Narrative    No narrative on file       OBJECTIVE:     Vital Signs Range (Last 24H):  Temp:  [36.4 °C (97.5 °F)-36.7 °C (98 °F)]   Pulse:  [59-94]   Resp:  [16-24]   BP: (104-144)/(55-74)   SpO2:  [90 %-100 %]       CBC:   Recent Labs      18   0648  18   0710   WBC  6.95  7.87   RBC  3.36*  3.29*   HGB  9.6*  9.3*   HCT  31.3*  30.9*   PLT  214  209   MCV  93  94   MCH  28.6  28.3   MCHC  30.7*  30.1*       CMP:   Recent Labs      18   0648  18   0710   NA  143  142   K  4.2  3.9   CL  106  105   CO2  29  27   BUN  40*  39*   CREATININE  1.3  1.3   GLU  137*  150*   MG  1.7  1.7   PHOS  3.3  3.2   CALCIUM  10.1  9.3   ALBUMIN  3.0*  2.8*   PROT  5.9*  5.6*   ALKPHOS  72  76   ALT  120*  103*   AST  62*  48*   BILITOT  1.1*  1.0       INR:  Recent Labs      18   1754   INR  1.1       Diagnostic Studies: No relevant studies.    EK18  Ventricular-paced rhythm  Abnormal ECG  When compared with ECG of 2010 06:08,  Electronic ventricular pacemaker has replaced Sinus rhythm    2D ECHO:  No results found for this or any previous visit.      ASSESSMENT/PLAN:         Anesthesia Evaluation    I have reviewed the Patient Summary Reports.    I have reviewed the Nursing Notes.   I have reviewed the Medications.     Review of Systems  Anesthesia Hx:  History of prior surgery of interest to airway management or planning: lung surgery. Denies  Family Hx of Anesthesia complications.   Denies Personal Hx of Anesthesia complications.   Hematology/Oncology:         -- Anemia: --  Cancer in past history (lung):    EENT/Dental:EENT/Dental Normal   Cardiovascular:   Pacemaker Hypertension CAD   CHF    Pulmonary:   Shortness of breath On home O2-2L   Hepatic/GI:  Hepatic/GI Normal    Neurological:  Neurology Normal    Endocrine:  Endocrine Normal        Physical Exam  General:  Well nourished    Airway/Jaw/Neck:  Airway Findings: Mouth Opening: Normal General Airway Assessment: Adult  Improves to I with phonation.  TM Distance: 4 - 6 cm     Eyes/Ears/Nose:  EYES/EARS/NOSE FINDINGS: Normal   Dental:  Dental Findings: In tact   Chest/Lungs:  Chest/Lungs Findings: Normal Respiratory Rate, Rales, Basilar     Heart/Vascular:  Heart Findings: Rate: Normal  Rhythm: Regular Rhythm  Heart Murmur  Systolic        Mental Status:  Mental Status Findings:  Cooperative         Anesthesia Plan  Type of Anesthesia, risks & benefits discussed:  Anesthesia Type:  general, MAC  Patient's Preference:   Intra-op Monitoring Plan: standard ASA monitors  Intra-op Monitoring Plan Comments:   Post Op Pain Control Plan: multimodal analgesia, IV/PO Opioids PRN and per primary service following discharge from PACU  Post Op Pain Control Plan Comments:   Induction:   IV  Beta Blocker:  Patient is not currently on a Beta-Blocker (No further documentation required).       Informed Consent: Patient understands risks and agrees with Anesthesia plan.  Questions answered. Anesthesia consent signed with patient.  ASA Score: 3     Day of Surgery Review of History & Physical:  There are no significant changes.  H&P update referred to the provider.         Ready For Surgery From Anesthesia Perspective.

## 2018-02-14 NOTE — PLAN OF CARE
Problem: Patient Care Overview  Goal: Plan of Care Review  Outcome: Ongoing (interventions implemented as appropriate)    Diuresis with Lasix 40 IV BID, strict I and O's reinforced with pt and support staff. Doxy po for cellutitis to left breast.  @ bedside throughout shift, pt and  updated on POC, all questions and concerns addressed. Will continue to monitor.

## 2018-02-14 NOTE — SUBJECTIVE & OBJECTIVE
Past Medical History:   Diagnosis Date    Abdominal aneurysm     Anemia     Bacterial meningitis     Cancer     lung cancer    CHF (congestive heart failure)     Coronary artery disease     Hyperlipidemia     Hypertension        Past Surgical History:   Procedure Laterality Date    CARDIAC PACEMAKER PLACEMENT      CORONARY ARTERY BYPASS GRAFT      EYE SURGERY      LUNG REMOVAL, PARTIAL Left        Review of patient's allergies indicates:   Allergen Reactions    Codeine        PTA Medications   Medication Sig    aspirin (ECOTRIN) 81 MG EC tablet Take 81 mg by mouth once daily.    atorvastatin (LIPITOR) 80 MG tablet Take 80 mg by mouth once daily.    clopidogrel (PLAVIX) 75 mg tablet Take 75 mg by mouth once daily.    FOLIC ACID/MULTIVIT-MIN/LUTEIN (CENTRUM SILVER ORAL) Take by mouth.    furosemide (LASIX) 40 MG tablet Take 40 mg by mouth once daily.    lisinopril (PRINIVIL,ZESTRIL) 20 MG tablet Take 20 mg by mouth once daily.    omega 3-dha-epa-fish oil (FISH OIL) 100-160-1,000 mg Cap Take by mouth.    omeprazole (PRILOSEC) 20 MG capsule Take 20 mg by mouth once daily.    sertraline (ZOLOFT) 25 MG tablet Take 25 mg by mouth once daily.    meclizine (ANTIVERT) 32 MG tablet Take 32 mg by mouth 3 (three) times daily as needed.     Family History     Problem Relation (Age of Onset)    Diabetes Mother    Heart failure Mother    Intracerebral hemorrhage Father        Social History Main Topics    Smoking status: Former Smoker    Smokeless tobacco: Never Used    Alcohol use No    Drug use: No    Sexual activity: Not on file     Review of Systems   Constitution: Negative for chills, diaphoresis, fever, weakness, weight gain and weight loss.   HENT: Negative for sore throat.    Eyes: Negative for blurred vision, vision loss in left eye, vision loss in right eye and visual disturbance.   Cardiovascular: Positive for dyspnea on exertion, leg swelling, orthopnea and paroxysmal nocturnal dyspnea.  Negative for chest pain, claudication, near-syncope, palpitations and syncope.   Respiratory: Negative for cough, hemoptysis, shortness of breath, sputum production and wheezing.    Endocrine: Negative for cold intolerance and heat intolerance.   Hematologic/Lymphatic: Negative for adenopathy. Does not bruise/bleed easily.   Skin: Negative for rash.        L breast swelling/redness   Musculoskeletal: Negative for falls, muscle weakness and myalgias.   Gastrointestinal: Negative for abdominal pain, change in bowel habit, constipation, diarrhea, melena and nausea.   Genitourinary: Negative for bladder incontinence.   Neurological: Negative for dizziness, focal weakness, headaches, light-headedness and numbness.   Psychiatric/Behavioral: Negative for altered mental status.     Objective:     Vital Signs (Most Recent):  Temp: 97.9 °F (36.6 °C) (02/14/18 0833)  Pulse: 60 (02/14/18 1206)  Resp: 20 (02/14/18 1206)  BP: 135/71 (02/14/18 1206)  SpO2: 100 % (02/14/18 1206) Vital Signs (24h Range):  Temp:  [97.5 °F (36.4 °C)-98 °F (36.7 °C)] 97.9 °F (36.6 °C)  Pulse:  [59-94] 60  Resp:  [16-24] 20  SpO2:  [90 %-100 %] 100 %  BP: (104-144)/(55-74) 135/71     Weight: 60.1 kg (132 lb 7.9 oz)  Body mass index is 22.74 kg/m².    SpO2: 100 %  O2 Device (Oxygen Therapy): nasal cannula      Intake/Output Summary (Last 24 hours) at 02/14/18 1341  Last data filed at 02/14/18 0600   Gross per 24 hour   Intake              550 ml   Output             1175 ml   Net             -625 ml       Lines/Drains/Airways     Peripheral Intravenous Line                 Peripheral IV - Single Lumen 02/12/18 1754 Left Wrist 1 day                Physical Exam   Constitutional: She is oriented to person, place, and time. She appears well-developed and well-nourished. No distress.   HENT:   Head: Normocephalic and atraumatic.   Mouth/Throat: Oropharynx is clear and moist.   Eyes: Conjunctivae and EOM are normal. Pupils are equal, round, and reactive to  light. No scleral icterus.   Neck: Neck supple. No JVD present. No tracheal deviation present.   Cardiovascular: Normal rate and regular rhythm.  Exam reveals no gallop and no friction rub.    Murmur heard.   Holosystolic murmur is present with a grade of 3/6  at the upper right sternal border radiating to the neck  Pulmonary/Chest: Effort normal and breath sounds normal. No respiratory distress. She has no wheezes. She has no rales. She exhibits no tenderness.   Abdominal: Soft. Bowel sounds are normal. She exhibits no distension. There is no hepatosplenomegaly. There is no tenderness.   Musculoskeletal: She exhibits no edema or tenderness.   Neurological: She is alert and oriented to person, place, and time.   Skin: Skin is warm and dry. No rash noted. No erythema.   Psychiatric: She has a normal mood and affect. Her behavior is normal.       Significant Labs:   CMP   Recent Labs  Lab 02/12/18 1754 02/13/18  0648 02/14/18  0710    143 142   K 4.5 4.2 3.9    106 105   CO2 24 29 27   * 137* 150*   BUN 41* 40* 39*   CREATININE 1.2 1.3 1.3   CALCIUM 9.2 10.1 9.3   PROT 6.2 5.9* 5.6*   ALBUMIN 3.1* 3.0* 2.8*   BILITOT 1.1* 1.1* 1.0   ALKPHOS 76 72 76   AST 76* 62* 48*   * 120* 103*   ANIONGAP 10 8 10   ESTGFRAFRICA 49.0* 44.5* 44.5*   EGFRNONAA 42.5* 38.6* 38.6*    and CBC   Recent Labs  Lab 02/12/18 1754 02/13/18  0648 02/14/18  0710   WBC 7.97 6.95 7.87   HGB 9.9* 9.6* 9.3*   HCT 32.4* 31.3* 30.9*    214 209

## 2018-02-15 ENCOUNTER — ANESTHESIA EVENT (OUTPATIENT)
Dept: MEDSURG UNIT | Facility: HOSPITAL | Age: 83
End: 2018-02-15

## 2018-02-15 ENCOUNTER — ANESTHESIA (OUTPATIENT)
Dept: MEDSURG UNIT | Facility: HOSPITAL | Age: 83
DRG: 291 | End: 2018-02-15
Payer: MEDICARE

## 2018-02-15 PROBLEM — I35.0 NONRHEUMATIC AORTIC VALVE STENOSIS: Status: ACTIVE | Noted: 2018-02-15

## 2018-02-15 PROBLEM — T82.03XA PARAVALVULAR LEAK OF PROSTHETIC HEART VALVE: Status: ACTIVE | Noted: 2018-02-15

## 2018-02-15 PROBLEM — I34.0 NON-RHEUMATIC MITRAL REGURGITATION: Status: ACTIVE | Noted: 2018-02-15

## 2018-02-15 LAB
ALBUMIN SERPL BCP-MCNC: 2.6 G/DL
ALP SERPL-CCNC: 66 U/L
ALT SERPL W/O P-5'-P-CCNC: 89 U/L
ANION GAP SERPL CALC-SCNC: 9 MMOL/L
ANISOCYTOSIS BLD QL SMEAR: SLIGHT
AORTIC ATHEROMA: YES
AORTIC VALVE REGURGITATION: ABNORMAL
AORTIC VALVE REGURGITATION: ABNORMAL
AORTIC VALVE STENOSIS: ABNORMAL
AST SERPL-CCNC: 45 U/L
BASOPHILS # BLD AUTO: 0.03 K/UL
BASOPHILS NFR BLD: 0.4 %
BILIRUB SERPL-MCNC: 0.9 MG/DL
BUN SERPL-MCNC: 37 MG/DL
BURR CELLS BLD QL SMEAR: ABNORMAL
CALCIUM SERPL-MCNC: 8.9 MG/DL
CHLORIDE SERPL-SCNC: 104 MMOL/L
CO2 SERPL-SCNC: 30 MMOL/L
CREAT SERPL-MCNC: 1.2 MG/DL
DIFFERENTIAL METHOD: ABNORMAL
EOSINOPHIL # BLD AUTO: 0.1 K/UL
EOSINOPHIL NFR BLD: 1 %
ERYTHROCYTE [DISTWIDTH] IN BLOOD BY AUTOMATED COUNT: 24.9 %
EST. GFR  (AFRICAN AMERICAN): 49 ML/MIN/1.73 M^2
EST. GFR  (NON AFRICAN AMERICAN): 42.5 ML/MIN/1.73 M^2
ESTIMATED PA SYSTOLIC PRESSURE: 48.64
GLUCOSE SERPL-MCNC: 135 MG/DL
HAPTOGLOB SERPL-MCNC: <10 MG/DL
HCT VFR BLD AUTO: 28.1 %
HGB BLD-MCNC: 8.8 G/DL
HYPOCHROMIA BLD QL SMEAR: ABNORMAL
IMM GRANULOCYTES # BLD AUTO: 0.02 K/UL
IMM GRANULOCYTES NFR BLD AUTO: 0.3 %
LDH SERPL L TO P-CCNC: 470 U/L
LYMPHOCYTES # BLD AUTO: 0.4 K/UL
LYMPHOCYTES NFR BLD: 6.1 %
MAGNESIUM SERPL-MCNC: 1.7 MG/DL
MCH RBC QN AUTO: 29 PG
MCHC RBC AUTO-ENTMCNC: 31.3 G/DL
MCV RBC AUTO: 93 FL
MITRAL VALVE MOBILITY: ABNORMAL
MITRAL VALVE REGURGITATION: ABNORMAL
MITRAL VALVE REGURGITATION: ABNORMAL
MONOCYTES # BLD AUTO: 0.8 K/UL
MONOCYTES NFR BLD: 11.6 %
NEUTROPHILS # BLD AUTO: 5.6 K/UL
NEUTROPHILS NFR BLD: 80.6 %
NRBC BLD-RTO: 0 /100 WBC
OVALOCYTES BLD QL SMEAR: ABNORMAL
PHOSPHATE SERPL-MCNC: 3.1 MG/DL
PLATELET # BLD AUTO: 186 K/UL
PLATELET BLD QL SMEAR: ABNORMAL
PMV BLD AUTO: 11.5 FL
POIKILOCYTOSIS BLD QL SMEAR: SLIGHT
POLYCHROMASIA BLD QL SMEAR: ABNORMAL
POTASSIUM SERPL-SCNC: 3.4 MMOL/L
PROT SERPL-MCNC: 5.2 G/DL
RBC # BLD AUTO: 3.03 M/UL
RETICS/RBC NFR AUTO: 2.8 %
RETIRED EF AND QEF - SEE NOTES: 60 (ref 55–65)
RETIRED EF AND QEF - SEE NOTES: 60 (ref 55–65)
SODIUM SERPL-SCNC: 143 MMOL/L
TRICUSPID VALVE REGURGITATION: ABNORMAL
TRICUSPID VALVE REGURGITATION: ABNORMAL
WBC # BLD AUTO: 6.9 K/UL

## 2018-02-15 PROCEDURE — 93325 DOPPLER ECHO COLOR FLOW MAPG: CPT

## 2018-02-15 PROCEDURE — 37000009 HC ANESTHESIA EA ADD 15 MINS

## 2018-02-15 PROCEDURE — 63600175 PHARM REV CODE 636 W HCPCS: Performed by: NURSE ANESTHETIST, CERTIFIED REGISTERED

## 2018-02-15 PROCEDURE — 80053 COMPREHEN METABOLIC PANEL: CPT

## 2018-02-15 PROCEDURE — 63600175 PHARM REV CODE 636 W HCPCS: Performed by: HOSPITALIST

## 2018-02-15 PROCEDURE — 85045 AUTOMATED RETICULOCYTE COUNT: CPT

## 2018-02-15 PROCEDURE — 99223 1ST HOSP IP/OBS HIGH 75: CPT | Mod: ,,, | Performed by: INTERNAL MEDICINE

## 2018-02-15 PROCEDURE — 84100 ASSAY OF PHOSPHORUS: CPT

## 2018-02-15 PROCEDURE — 97116 GAIT TRAINING THERAPY: CPT

## 2018-02-15 PROCEDURE — D9220A PRA ANESTHESIA: Mod: CRNA,,, | Performed by: NURSE ANESTHETIST, CERTIFIED REGISTERED

## 2018-02-15 PROCEDURE — 93312 ECHO TRANSESOPHAGEAL: CPT | Mod: 26,,, | Performed by: INTERNAL MEDICINE

## 2018-02-15 PROCEDURE — 25000003 PHARM REV CODE 250: Performed by: NURSE PRACTITIONER

## 2018-02-15 PROCEDURE — 93320 DOPPLER ECHO COMPLETE: CPT | Mod: 26,,, | Performed by: INTERNAL MEDICINE

## 2018-02-15 PROCEDURE — 25000003 PHARM REV CODE 250: Performed by: HOSPITALIST

## 2018-02-15 PROCEDURE — D9220A PRA ANESTHESIA: Mod: ANES,,, | Performed by: ANESTHESIOLOGY

## 2018-02-15 PROCEDURE — 83735 ASSAY OF MAGNESIUM: CPT

## 2018-02-15 PROCEDURE — 83615 LACTATE (LD) (LDH) ENZYME: CPT

## 2018-02-15 PROCEDURE — 85025 COMPLETE CBC W/AUTO DIFF WBC: CPT

## 2018-02-15 PROCEDURE — 11000001 HC ACUTE MED/SURG PRIVATE ROOM

## 2018-02-15 PROCEDURE — 36415 COLL VENOUS BLD VENIPUNCTURE: CPT

## 2018-02-15 PROCEDURE — 99233 SBSQ HOSP IP/OBS HIGH 50: CPT | Mod: ,,, | Performed by: HOSPITALIST

## 2018-02-15 PROCEDURE — 37000008 HC ANESTHESIA 1ST 15 MINUTES

## 2018-02-15 PROCEDURE — 93325 DOPPLER ECHO COLOR FLOW MAPG: CPT | Mod: 26,,, | Performed by: INTERNAL MEDICINE

## 2018-02-15 PROCEDURE — 25000003 PHARM REV CODE 250: Performed by: NURSE ANESTHETIST, CERTIFIED REGISTERED

## 2018-02-15 PROCEDURE — 83010 ASSAY OF HAPTOGLOBIN QUANT: CPT

## 2018-02-15 RX ORDER — DIPHENHYDRAMINE HYDROCHLORIDE 50 MG/ML
25 INJECTION INTRAMUSCULAR; INTRAVENOUS EVERY 6 HOURS PRN
Status: DISCONTINUED | OUTPATIENT
Start: 2018-02-15 | End: 2018-02-15

## 2018-02-15 RX ORDER — FENTANYL CITRATE 50 UG/ML
25 INJECTION, SOLUTION INTRAMUSCULAR; INTRAVENOUS EVERY 5 MIN PRN
Status: DISCONTINUED | OUTPATIENT
Start: 2018-02-15 | End: 2018-02-17 | Stop reason: HOSPADM

## 2018-02-15 RX ORDER — HEPARIN SODIUM 5000 [USP'U]/ML
5000 INJECTION, SOLUTION INTRAVENOUS; SUBCUTANEOUS EVERY 8 HOURS
Status: DISCONTINUED | OUTPATIENT
Start: 2018-02-15 | End: 2018-02-17 | Stop reason: HOSPADM

## 2018-02-15 RX ORDER — PROPOFOL 10 MG/ML
VIAL (ML) INTRAVENOUS
Status: DISCONTINUED | OUTPATIENT
Start: 2018-02-15 | End: 2018-02-15

## 2018-02-15 RX ORDER — LIDOCAINE HCL/PF 100 MG/5ML
SYRINGE (ML) INTRAVENOUS
Status: DISCONTINUED | OUTPATIENT
Start: 2018-02-15 | End: 2018-02-15

## 2018-02-15 RX ORDER — FENTANYL CITRATE 50 UG/ML
25 INJECTION, SOLUTION INTRAMUSCULAR; INTRAVENOUS EVERY 5 MIN PRN
Status: DISCONTINUED | OUTPATIENT
Start: 2018-02-15 | End: 2018-02-15

## 2018-02-15 RX ORDER — HEPARIN SODIUM 5000 [USP'U]/ML
5000 INJECTION, SOLUTION INTRAVENOUS; SUBCUTANEOUS EVERY 8 HOURS
Status: DISCONTINUED | OUTPATIENT
Start: 2018-02-15 | End: 2018-02-15

## 2018-02-15 RX ORDER — ETOMIDATE 2 MG/ML
INJECTION INTRAVENOUS
Status: DISCONTINUED | OUTPATIENT
Start: 2018-02-15 | End: 2018-02-15

## 2018-02-15 RX ORDER — MAGNESIUM SULFATE HEPTAHYDRATE 40 MG/ML
2 INJECTION, SOLUTION INTRAVENOUS ONCE
Status: DISCONTINUED | OUTPATIENT
Start: 2018-02-15 | End: 2018-02-15

## 2018-02-15 RX ORDER — POTASSIUM CHLORIDE 20 MEQ/1
40 TABLET, EXTENDED RELEASE ORAL
Status: COMPLETED | OUTPATIENT
Start: 2018-02-15 | End: 2018-02-15

## 2018-02-15 RX ORDER — DIPHENHYDRAMINE HYDROCHLORIDE 50 MG/ML
25 INJECTION INTRAMUSCULAR; INTRAVENOUS EVERY 6 HOURS PRN
Status: DISCONTINUED | OUTPATIENT
Start: 2018-02-15 | End: 2018-02-17 | Stop reason: HOSPADM

## 2018-02-15 RX ADMIN — SERTRALINE HYDROCHLORIDE 25 MG: 25 TABLET ORAL at 11:02

## 2018-02-15 RX ADMIN — ETOMIDATE 2 MG: 2 INJECTION, SOLUTION INTRAVENOUS at 09:02

## 2018-02-15 RX ADMIN — DOXYCYCLINE HYCLATE 100 MG: 100 TABLET, COATED ORAL at 09:02

## 2018-02-15 RX ADMIN — PROPOFOL 10 MG: 10 INJECTION, EMULSION INTRAVENOUS at 09:02

## 2018-02-15 RX ADMIN — FUROSEMIDE 60 MG: 10 INJECTION, SOLUTION INTRAMUSCULAR; INTRAVENOUS at 09:02

## 2018-02-15 RX ADMIN — POTASSIUM CHLORIDE 40 MEQ: 1500 TABLET, EXTENDED RELEASE ORAL at 11:02

## 2018-02-15 RX ADMIN — LISINOPRIL 20 MG: 20 TABLET ORAL at 11:02

## 2018-02-15 RX ADMIN — HEPARIN SODIUM 5000 UNITS: 5000 INJECTION, SOLUTION INTRAVENOUS; SUBCUTANEOUS at 01:02

## 2018-02-15 RX ADMIN — ATORVASTATIN CALCIUM 80 MG: 20 TABLET, FILM COATED ORAL at 11:02

## 2018-02-15 RX ADMIN — SODIUM CHLORIDE, SODIUM GLUCONATE, SODIUM ACETATE, POTASSIUM CHLORIDE, MAGNESIUM CHLORIDE, SODIUM PHOSPHATE, DIBASIC, AND POTASSIUM PHOSPHATE: .53; .5; .37; .037; .03; .012; .00082 INJECTION, SOLUTION INTRAVENOUS at 09:02

## 2018-02-15 RX ADMIN — PROPOFOL 20 MG: 10 INJECTION, EMULSION INTRAVENOUS at 09:02

## 2018-02-15 RX ADMIN — DOXYCYCLINE HYCLATE 100 MG: 100 TABLET, COATED ORAL at 11:02

## 2018-02-15 RX ADMIN — PROPOFOL 30 MG: 10 INJECTION, EMULSION INTRAVENOUS at 09:02

## 2018-02-15 RX ADMIN — ASPIRIN 81 MG: 81 TABLET, COATED ORAL at 11:02

## 2018-02-15 RX ADMIN — Medication 1 CAPSULE: at 11:02

## 2018-02-15 RX ADMIN — ACETAMINOPHEN 650 MG: 325 TABLET ORAL at 09:02

## 2018-02-15 RX ADMIN — FUROSEMIDE 60 MG: 10 INJECTION, SOLUTION INTRAMUSCULAR; INTRAVENOUS at 01:02

## 2018-02-15 RX ADMIN — PANTOPRAZOLE SODIUM 40 MG: 40 TABLET, DELAYED RELEASE ORAL at 11:02

## 2018-02-15 RX ADMIN — MAGNESIUM SULFATE HEPTAHYDRATE 2 G: 500 INJECTION, SOLUTION INTRAMUSCULAR; INTRAVENOUS at 11:02

## 2018-02-15 RX ADMIN — FUROSEMIDE 60 MG: 10 INJECTION, SOLUTION INTRAMUSCULAR; INTRAVENOUS at 06:02

## 2018-02-15 RX ADMIN — POTASSIUM CHLORIDE 40 MEQ: 1500 TABLET, EXTENDED RELEASE ORAL at 01:02

## 2018-02-15 RX ADMIN — HEPARIN SODIUM 5000 UNITS: 5000 INJECTION, SOLUTION INTRAVENOUS; SUBCUTANEOUS at 09:02

## 2018-02-15 RX ADMIN — CLOPIDOGREL 75 MG: 75 TABLET, FILM COATED ORAL at 11:02

## 2018-02-15 RX ADMIN — LIDOCAINE HYDROCHLORIDE 60 MG: 20 INJECTION, SOLUTION INTRAVENOUS at 09:02

## 2018-02-15 RX ADMIN — MAGNESIUM SULFATE HEPTAHYDRATE 2 G: 500 INJECTION, SOLUTION INTRAMUSCULAR; INTRAVENOUS at 09:02

## 2018-02-15 NOTE — H&P
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    She was scheduled to see Dr. Cobos on 2/26 and was supposed to undergo IVAN on 2/16/18 prior to her clinic visit to evaluate a moderate perivalvular leak that was seen on IVAN back in 11/2017.    IVAN requested as patient is admitted for IV diuresis.    Dysphagia or odynophagia:  NO  Liver Disease, esophageal disease, or known varices:  NO  Upper GI Bleeding: NO  Snoring:  NO  Sleep Apnea:  NO  Prior neck surgery or radiation:  NO  Able to move neck in all directions:  YES  +1  History of anesthetic difficulties:  NO  Family history of anesthetic difficulties:  NO  Last oral intake:  7 PM on 2/14/18    Mallampati Class:  2  ASA Score:  3    Meds:     Scheduled Meds:   aspirin  81 mg Oral Daily    atorvastatin  80 mg Oral Daily    clopidogrel  75 mg Oral Daily    doxycycline  100 mg Oral Q12H    furosemide  60 mg Intravenous TID    lisinopril  20 mg Oral Daily    omega-3 fatty acids-fish oil  1 capsule Oral Daily    pantoprazole  40 mg Oral Daily    sertraline  25 mg Oral Daily     PRN Meds:acetaminophen, albuterol-ipratropium 2.5mg-0.5mg/3mL, bisacodyl, dextrose 50%, dextrose 50%, glucagon (human recombinant), glucose, glucose, meclizine, ondansetron, ondansetron, senna-docusate 8.6-50 mg, sodium chloride 0.9%  Continuous Infusions:    Physical Exam:     Vitals:  Temp:  [97.9 °F (36.6 °C)-98.2 °F (36.8 °C)]   Pulse:  [60-74]   Resp:  [18-24]   BP: (131-162)/(67-88)   SpO2:  [94 %-100 %]        Constitutional:  NAD, conversant  HEENT:   Sclera anicteric, Uvula midline, EOMI, OP clear  Neck:   No JVD, moves to all direction without any limitations  CV:   RRR, 3/6 holosystolic murmur in RUSB with radiation to neck, normal S1/S2  Pulm:   CTAB, no wheezes,  rales, or ronchi  GI:   Abdomen soft, NTND, +BS  Extremities:  No LE edema, warm with palpable pulses  Neuro:   AAOX3, no focal motor deficits    Labs:     Recent Results (from the past 336 hour(s))   CBC with Automated Differential    Collection Time: 02/14/18  7:10 AM   Result Value Ref Range    WBC 7.87 3.90 - 12.70 K/uL    Hemoglobin 9.3 (L) 12.0 - 16.0 g/dL    Hematocrit 30.9 (L) 37.0 - 48.5 %    Platelets 209 150 - 350 K/uL   CBC with Automated Differential    Collection Time: 02/13/18  6:48 AM   Result Value Ref Range    WBC 6.95 3.90 - 12.70 K/uL    Hemoglobin 9.6 (L) 12.0 - 16.0 g/dL    Hematocrit 31.3 (L) 37.0 - 48.5 %    Platelets 214 150 - 350 K/uL   CBC auto differential    Collection Time: 02/12/18  5:54 PM   Result Value Ref Range    WBC 7.97 3.90 - 12.70 K/uL    Hemoglobin 9.9 (L) 12.0 - 16.0 g/dL    Hematocrit 32.4 (L) 37.0 - 48.5 %    Platelets 206 150 - 350 K/uL       No results found for this or any previous visit (from the past 336 hour(s)).    Estimated Creatinine Clearance: 29.3 mL/min (based on SCr of 1.3 mg/dL).    TTE (2/14/18)  CONCLUSIONS     1 - Upper limit of normal left ventricular enlargement.     2 - Normal left ventricular systolic function (EF 60-65%).     3 - Eccentric hypertrophy.     4 - Wall motion abnormalities.     5 - Right ventricle is upper limit of normal in size with not well seen systolic function.     6 - Biatrial enlargement.     7 - Moderate to severe aortic stenosis, HILARY = 0.92 cm2, AVAi = 0.56 cm2/m2, peak velocity = 4.21 m/s, mean gradient = 46 mmHg.     8 - Mild aortic regurgitation.     9 - Moderate to severe mitral regurgitation.     10 - Mild tricuspid regurgitation.     11 - Increased central venous pressure.     12 - Pulmonary hypertension. The estimated PA systolic pressure is 49 mmHg.     Assessment & Plan:     PLAN:  1. IVAN for evaluation of s/p AVR, concern for perivalvular leak    -The risks, benefits & alternatives of the procedure were explained to  the patient.    -The risks of transesophageal echo include but are not limited to:  Dental trauma, esophageal trauma/perforation, bleeding, laryngospasm/brochospasm, aspiration, sore throat/hoarseness, & dislodgement of the endotracheal tube/nasogastric tube (where applicable).    -The risks of moderate sedation include hypotension, respiratory depression, arrhythmias, bronchospasm, & death.    -Informed consent was obtained & the patient is agreeable to proceed with the procedure.    I will discuss with the attending physician. Attending addendum is to follow.    Signed:  Miguel Ángel Snyder MD  Cardiovascular Disease Fellow, PGY-IV  Pager: [phone]  2/15/2018 7:34 AM    Attending Addendum:

## 2018-02-15 NOTE — NURSING TRANSFER
Nursing Transfer Note      2/15/2018     Transfer To: [id number]    Transfer via bed    Transfer with NC to O2, tele    Transported by RN    Medicines sent: no    Chart send with patient: Yes    Notified: daughter    Patient reassessed at: 2/15/18@1032hrs

## 2018-02-15 NOTE — ANESTHESIA POSTPROCEDURE EVALUATION
"Anesthesia Post Evaluation    Patient: Delmi Nunez    Procedure(s) Performed: Procedure(s) (LRB):  TRANSESOPHAGEAL ECHOCARDIOGRAM (IVAN) (N/A)    Final Anesthesia Type: general  Patient location during evaluation: PACU  Patient participation: Yes- Able to Participate  Level of consciousness: awake and alert  Post-procedure vital signs: reviewed and stable  Pain management: adequate  Airway patency: patent  PONV status at discharge: No PONV  Anesthetic complications: no      Cardiovascular status: hemodynamically stable  Respiratory status: unassisted  Hydration status: euvolemic  Follow-up not needed.        Visit Vitals  /65   Pulse 62   Temp 36.7 °C (98 °F) (Oral)   Resp 19   Ht 5' 4" (1.626 m)   Wt 58.2 kg (128 lb 4.9 oz)   SpO2 98%   Breastfeeding? No   BMI 22.02 kg/m²       Pain/Lamberto Score: Pain Assessment Performed: Yes (2/15/2018 10:32 AM)  Presence of Pain: denies (2/15/2018 10:32 AM)  Pain Rating Prior to Med Admin: 6 (2/14/2018  9:12 PM)  Pain Rating Post Med Admin: 3 (2/14/2018 10:12 PM)  Lamberto Score: 9 (2/15/2018 10:32 AM)      "

## 2018-02-15 NOTE — PLAN OF CARE
Problem: Patient Care Overview  Goal: Plan of Care Review  Outcome: Ongoing (interventions implemented as appropriate)  POC reviewed with patient. Pt remains free from fall/injury/trauma.  NPO for IVAN tomorrow.  VSS and NADN. Will continue to monitor patient.

## 2018-02-15 NOTE — ASSESSMENT & PLAN NOTE
Diuresing well with IV Lasix 60mg TID.  -2.8 L  LFTs trending down. Weight down 2kg since yesterday.

## 2018-02-15 NOTE — SUBJECTIVE & OBJECTIVE
Interval History: No events overnight. No complaints this AM. Breathing is improved.       Objective:     Vital Signs (Most Recent):  Temp: 98.1 °F (36.7 °C) (02/15/18 1147)  Pulse: 64 (02/15/18 1456)  Resp: (!) 21 (02/15/18 1147)  BP: 126/61 (02/15/18 1147)  SpO2: 97 % (02/15/18 1147) Vital Signs (24h Range):  Temp:  [98 °F (36.7 °C)-98.2 °F (36.8 °C)] 98.1 °F (36.7 °C)  Pulse:  [60-73] 64  Resp:  [18-24] 21  SpO2:  [94 %-100 %] 97 %  BP: (103-162)/(54-88) 126/61     Weight: 58.2 kg (128 lb 4.9 oz)  Body mass index is 22.02 kg/m².    SpO2: 97 %  O2 Device (Oxygen Therapy): room air      Intake/Output Summary (Last 24 hours) at 02/15/18 1527  Last data filed at 02/15/18 1500   Gross per 24 hour   Intake             1210 ml   Output             2900 ml   Net            -1690 ml       Lines/Drains/Airways     Peripheral Intravenous Line                 Peripheral IV - Single Lumen 02/12/18 1754 Left Wrist 2 days                Physical Exam   Constitutional: She is oriented to person, place, and time. She appears well-developed and well-nourished. No distress.   HENT:   Head: Normocephalic and atraumatic.   Mouth/Throat: Oropharynx is clear and moist.   Eyes: Conjunctivae and EOM are normal. Pupils are equal, round, and reactive to light. No scleral icterus.   Neck: Neck supple. No JVD present. No tracheal deviation present.   Cardiovascular: Normal rate and regular rhythm.  Exam reveals no gallop and no friction rub.    Murmur heard.   Holosystolic murmur is present with a grade of 3/6  at the upper right sternal border radiating to the neck  Pulmonary/Chest: Effort normal and breath sounds normal. No respiratory distress. She has no wheezes. She has no rales. She exhibits no tenderness.   Abdominal: Soft. Bowel sounds are normal. She exhibits no distension. There is no hepatosplenomegaly. There is no tenderness.   Musculoskeletal: She exhibits no edema or tenderness.   Neurological: She is alert and oriented to  person, place, and time.   Skin: Skin is warm and dry. No rash noted. No erythema.   Psychiatric: She has a normal mood and affect. Her behavior is normal.       Significant Labs:   CMP   Recent Labs  Lab 02/14/18  0710 02/15/18  0655    143   K 3.9 3.4*    104   CO2 27 30*   * 135*   BUN 39* 37*   CREATININE 1.3 1.2   CALCIUM 9.3 8.9   PROT 5.6* 5.2*   ALBUMIN 2.8* 2.6*   BILITOT 1.0 0.9   ALKPHOS 76 66   AST 48* 45*   * 89*   ANIONGAP 10 9   ESTGFRAFRICA 44.5* 49.0*   EGFRNONAA 38.6* 42.5*    and CBC   Recent Labs  Lab 02/14/18  0710 02/15/18  0655   WBC 7.87 6.90   HGB 9.3* 8.8*   HCT 30.9* 28.1*    186        Ref. Range 2/15/2018 00:45 2/15/2018 06:55   Haptoglobin Latest Ref Range: 30 - 250 mg/dL <10 (L)    Retic Latest Ref Range: 0.5 - 2.5 %  2.8 (H)      Ref. Range 2/15/2018 00:45   LD Latest Ref Range: 110 - 260 U/L 470 (H)

## 2018-02-15 NOTE — ASSESSMENT & PLAN NOTE
HILARY = 0.92 cm2, AVAi = 0.56 cm2/m2, peak velocity = 4.21 m/s, mean gradient = 46 mmHg, EF= 60%.  Can proceed with TAVR work-up after her PVL closure.   She will need TAVR CTA, CTS consult, and PFTs.  STS= 9.9%  Frailty to be done on 2/16.

## 2018-02-15 NOTE — ASSESSMENT & PLAN NOTE
IVAN today shows severe paravalvular aortic regurgitation with large, anterior PVL ( between LCC and RCC and under RCC). The regurgitant jet splits into two jets diagnoally across the LVOT.   Will schedule for outpatient PVL closure in the near future.

## 2018-02-15 NOTE — PLAN OF CARE
Medical records received from [facility], placed on chart for MD, will follow.

## 2018-02-15 NOTE — ASSESSMENT & PLAN NOTE
IVAN shows tethered posterior leaflet with severe mitral regurgitation with a large centrally originating and posteriorly directed regugitant jet.  Likely ischemic MR (patient has SVG to RCA).

## 2018-02-15 NOTE — PROGRESS NOTES
Progress Note   [facility]       Team: [facility] C Gisela Cobos MD  Admit Date: 2/12/2018  Length of Stay:  LOS: 3 days   LUISITO 2/16/2018  Principal Problem:  Acute on chronic congestive heart failure    HPI:  Mrs. Nunez is a pleasant 84yo  female with history of CHF (unknown details), chronic resp failure on home O2 2L x 2 weeks, HTN, CAD (?details), HTN, HLD, AAA (?details), h/o lung cancer (adenocarcinoma, ?carcinoid) s/p wedge and partial lobectomy '15, and anemia who presented to ED with shortness of breath with activity and at rest and B LE edema.  She was recently admitted to [facility] for same complaints and was discharged on 1/29/18 with home oxygen 2L/NC.  She states her Lasix has recently been increased from 20mg once daily to 40mg once daily within last week. She denies CP, cough, fever, chills, headache, and sputum production. She also reports redness of left breast and bruising of her left foot x 1 day.  She states she noticed these this morning when she woke up.  She denies numbness, pain, or trauma to either area; denies discharge from left breast    Hospital Course: Admitted to [facility], started on Lasix 40 IV BID. net -780 cc x 12 hours, continues with AVILES. The next day, net -625cc, weight unchanged, increased Lasix to 60 IV TID. Valve team consulted. 2D Echo with CFD CONCLUSIONS     1 - Upper limit of normal left ventricular enlargement.     2 - Normal left ventricular systolic function (EF 60-65%).     3 - Eccentric hypertrophy.     4 - Wall motion abnormalities.     5 - Right ventricle is upper limit of normal in size with not well seen systolic function.     6 - Biatrial enlargement.     7 - Moderate to severe aortic stenosis, HILARY = 0.92 cm2, AVAi = 0.56 cm2/m2, peak velocity = 4.21 m/s, mean gradient = 46 mmHg.     8 - Mild aortic regurgitation.     9 - Moderate to severe mitral regurgitation.     10 - Mild tricuspid regurgitation.     11 - Increased central venous  "pressure.     12 - Pulmonary hypertension. The estimated PA systolic pressure is 49 mmHg.     Interval hx / overnight events:  Improved diuresis, net -2000.  Weight decreased to 128 from 132. C/O B leg cramps. IVAN done and revealed "severe paravalvular aortic regurgitation. There is a bioprosthetic aortic valve with large, anterior PVL ( between LCC and RCC and under RCC).  The regurgitant jet splits into two jets diagnoally across the LVOT."  Valve team plans for outpatient PVL closure in the near future, then TAVR workup.  Continue diuresis, have a lot of fluid still to diurese, aim for d/c home Monday     Areas of concern/ handoff: none    ROS:  Pain Scale: 0 /10   Constitutional: no fever or chills  Respiratory: no cough; +AVILES - improving  Cardiovascular: no chest pain or palpitations  Gastrointestinal: no nausea or vomiting, no abdominal pain or change in bowel habits  Genitourinary: no hematuria or dysuria  Integument/Breast: no rash or pruritis  Hematologic/Lymphatic: no easy bruising or lymphadenopathy  Musculoskeletal: no arthralgias or myalgias  Neurological: no seizures or tremors  Behavioral/Psych: no depression or anxiety      Vital Signs Range (Last 24H):  Temp:  [98.2 °F (36.8 °C)]   Pulse:  [60-74]   Resp:  [18-24]   BP: (131-162)/(62-88)   SpO2:  [94 %-100 %]     I & O (Last 24H):    Intake/Output Summary (Last 24 hours) at 02/15/18 0908  Last data filed at 02/15/18 0800   Gross per 24 hour   Intake              880 ml   Output             2800 ml   Net            -1920 ml       Physical Exam:  General appearance: no distress, pale, elderly, well-groomed  Mental status: Alert and oriented x 3, pleasant  HEENT:  conjunctivae/corneas clear, PERRL  Neck: supple, thyroid not enlarged, +JVD  Pulm:   normal respiratory effort, CTA B, no c/w/r  Card: RRR, S1, S2 normal, +systolic murmur, no click, rub or gallop  Abd: soft, NT, ND, BS present; no masses, no organomegaly  Ext: 1+ edema to B knees - slight " improvement  Pulses: 2+, symmetric  Skin: color, texture, turgor normal. L breast +erythema 10cm around nipple (similar to prior), but edema improved from prior  Neuro: CN II-XII grossly intact, no focal numbness or weakness, normal strength and tone     Diagnostic Results:  Labs reviewed    Recent Results (from the past 24 hour(s))   Occult blood x 1, stool    Collection Time: 02/14/18 10:16 AM   Result Value Ref Range    Occult Blood Negative Negative   2D echo with color flow doppler    Collection Time: 02/14/18  2:45 PM   Result Value Ref Range    EF 60 55 - 65    Mitral Valve Regurgitation MODERATE TO SEVERE (A)     Aortic Valve Regurgitation MILD     Aortic Valve Stenosis MODERATE TO SEVERE (A)     Est. PA Systolic Pressure 48.64 (A)     Tricuspid Valve Regurgitation MILD    Lactate dehydrogenase    Collection Time: 02/15/18 12:45 AM   Result Value Ref Range     (H) 110 - 260 U/L   Haptoglobin    Collection Time: 02/15/18 12:45 AM   Result Value Ref Range    Haptoglobin <10 (L) 30 - 250 mg/dL   Comprehensive Metabolic Panel (CMP)    Collection Time: 02/15/18  6:55 AM   Result Value Ref Range    Sodium 143 136 - 145 mmol/L    Potassium 3.4 (L) 3.5 - 5.1 mmol/L    Chloride 104 95 - 110 mmol/L    CO2 30 (H) 23 - 29 mmol/L    Glucose 135 (H) 70 - 110 mg/dL    BUN, Bld 37 (H) 8 - 23 mg/dL    Creatinine 1.2 0.5 - 1.4 mg/dL    Calcium 8.9 8.7 - 10.5 mg/dL    Total Protein 5.2 (L) 6.0 - 8.4 g/dL    Albumin 2.6 (L) 3.5 - 5.2 g/dL    Total Bilirubin 0.9 0.1 - 1.0 mg/dL    Alkaline Phosphatase 66 55 - 135 U/L    AST 45 (H) 10 - 40 U/L    ALT 89 (H) 10 - 44 U/L    Anion Gap 9 8 - 16 mmol/L    eGFR if African American 49.0 (A) >60 mL/min/1.73 m^2    eGFR if non  42.5 (A) >60 mL/min/1.73 m^2   Magnesium    Collection Time: 02/15/18  6:55 AM   Result Value Ref Range    Magnesium 1.7 1.6 - 2.6 mg/dL   Phosphorus    Collection Time: 02/15/18  6:55 AM   Result Value Ref Range    Phosphorus 3.1 2.7 - 4.5  mg/dL   CBC with Automated Differential    Collection Time: 02/15/18  6:55 AM   Result Value Ref Range    WBC 6.90 3.90 - 12.70 K/uL    RBC 3.03 (L) 4.00 - 5.40 M/uL    Hemoglobin 8.8 (L) 12.0 - 16.0 g/dL    Hematocrit 28.1 (L) 37.0 - 48.5 %    MCV 93 82 - 98 fL    MCH 29.0 27.0 - 31.0 pg    MCHC 31.3 (L) 32.0 - 36.0 g/dL    RDW 24.9 (H) 11.5 - 14.5 %    Platelets 186 150 - 350 K/uL    MPV 11.5 9.2 - 12.9 fL    Immature Granulocytes 0.3 0.0 - 0.5 %    Gran # (ANC) 5.6 1.8 - 7.7 K/uL    Immature Grans (Abs) 0.02 0.00 - 0.04 K/uL    Lymph # 0.4 (L) 1.0 - 4.8 K/uL    Mono # 0.8 0.3 - 1.0 K/uL    Eos # 0.1 0.0 - 0.5 K/uL    Baso # 0.03 0.00 - 0.20 K/uL    nRBC 0 0 /100 WBC    Gran% 80.6 (H) 38.0 - 73.0 %    Lymph% 6.1 (L) 18.0 - 48.0 %    Mono% 11.6 4.0 - 15.0 %    Eosinophil% 1.0 0.0 - 8.0 %    Basophil% 0.4 0.0 - 1.9 %    Platelet Estimate Appears normal     Aniso Slight     Poik Slight     Poly Occasional     Hypo Occasional     Ovalocytes Occasional     [place] Cells Occasional     Differential Method Automated    Reticulocytes    Collection Time: 02/15/18  6:55 AM   Result Value Ref Range    Retic 2.8 (H) 0.5 - 2.5 %       No results for input(s): POCTGLUCOSE in the last 168 hours.       aspirin  81 mg Oral Daily    atorvastatin  80 mg Oral Daily    clopidogrel  75 mg Oral Daily    doxycycline  100 mg Oral Q12H    furosemide  60 mg Intravenous TID    lisinopril  20 mg Oral Daily    omega-3 fatty acids-fish oil  1 capsule Oral Daily    pantoprazole  40 mg Oral Daily    potassium chloride SA  40 mEq Oral Q2H    sertraline  25 mg Oral Daily       Assessment and Plan     Active Hospital Problems    Diagnosis  POA    *Acute on chronic congestive heart failure [I50.9]  Yes    Atherosclerosis of aorta [I70.0]  Unknown    Protein calorie malnutrition [E46]  Yes    Elevated LFTs [R79.89]  Yes    Acute on chronic respiratory failure with hypoxemia [J96.21]  Yes    Mild malnutrition [E44.1]  Yes    Pacemaker  [Z95.0]  Yes     '03 for heart block      History of lung cancer [Z85.118]  Not Applicable     Pulm: Dr Berrios at [facility]      S/P AVR (aortic valve replacement) [Z95.2]  Not Applicable     Bovine '03      Weight loss [R63.4]  Yes    CAD (coronary artery disease) [I25.10]  Yes    Essential hypertension [I10]  Yes    Anemia [D64.9]  Yes    Erythema of breast [L53.9]  Yes    Hyperlipemia [E78.5]  Yes      Resolved Hospital Problems    Diagnosis Date Resolved POA   No resolved problems to display.       Problems Addressed today:    Acute on chronic systolic CHF  Severe AS  Paravalvular AV leak  Chronic hypoxemic respiratory failure on home O2 2L  - no prior records on file - previously received care at   - Lasix 60 IV TID  - Continue oxygen 2L/NC and wean as tolerated to maintain saturations greater than 92%  - daily wt  - strict I/O's   - 1.5L fluid restriction while inpt   - telemetry  - keep Mg >2, K >4  - outpt PVL repair (of note, Thursdays are best for pt/family), then outpt TAVR workup    L breast erythema - DDx cellulitis, other. ?peau d'orange. Negative fever and tenderness. WBC wnl but with 82% granulocytes  - f/u Ultrasound of breast  - improving on Doxy 100 PO BID  - needs outpt mammogram ASAP (unable to obtain mammogram as inpatient)    Essential HTN  - home Lisinopril 20  - NO BB for now (not on BB at home and may need to give positive inotropic support)    CAD - ?details  - troponin elevated 0.413, denies CP, EKG without obvious ischemic changes, likely related to worsening cardiac function, trend Q 6 x3    Transaminitis - likely congestive hepatopathy  - monitor for improvement after diuresis  - daily CMP  - if no improvement, abd US    Abnormal thyroid labs - TSH slightly elevated but FT4 wnl  - re-check TFTs when out of acute setting    Anemia of acute on chronic illness. Unclear etiology - had EGD and C-scope at [facility] with no source found; VCE not done. No BPR, no other blood loss. Recent  transfusion of 2u pRBCs at [facility] 1/25. Fe studies unremarkable (ferritin wnl, TIBC wnl) except Fe sat low at 14%  - check retic count  - outpt Hematology eval, may need BMBx    Elevated A1C - 6.3 - prediabetes  -  on diet and lifestyle modifications    Weight loss - 30 pounds in 3-6 months. Prior weight 157 --> 128 (dry weight)    HypoMg - replaced IV this morning; she complains of new leg cramps thsi8 evening so will give additional Mg IV 2g    HypoK - replace PO    Mild malnutrition - Alb 3.0. Prealbumin wnl.  - f/u nutrition consult  - start MVI    Deconditioning   - high risk of falls; utilize all safety measures and fall precautions   - PT/OT    PPX: SCDs; hep 5K q8 - hold after tonight's dose (for IVAN tomorrow), then resume tomorrow after IVAN    Goals of Care/Advanced Directives: full code    Disposition/Post-Acute Care: pending clinical condition, likely home with no services    Discussed plan with her, , and daughter at bedside    Time spent in care of the patient (Greater than 1/2 spent in direct face-to-face contact) 40 minutes    Gisela Cobos MD

## 2018-02-15 NOTE — ANESTHESIA PREPROCEDURE EVALUATION
[facility]  Anesthesia Pre-Operative Evaluation         Patient Name: Delmi Nunez  YOB: 1935  MRN: [id number]    SUBJECTIVE:     Pre-operative evaluation for Procedure(s) (LRB):  TRANSESOPHAGEAL ECHOCARDIOGRAM (IVAN) (N/A)     02/15/2018    Delmi Nunez is a 83 y.o. 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 Patient now endorsing PND, orthopnea, and LE edema. LHC in 11/2017 showed widely patent SVG to RCA and otherwise non-obstructive CAD. IVAN 11/2017 showed moderate perivalvular leak. She was subsequently referred for consideration of PVL closure. She was scheduled for IVAN on 02/16/18 and clinic appointment on 02/26/18. Prior to admission     Patient now presents for the above procedure(s).      LDA:       Peripheral IV - Single Lumen 02/12/18 1754 Left Wrist (Active)   Site Assessment Clean;Dry;Intact;No redness;No swelling 2/14/2018  9:00 AM   Line Status Saline locked 2/14/2018  9:00 AM   Dressing Status Clean;Dry;Intact 2/13/2018  8:31 PM   Dressing Intervention New dressing 2/13/2018  9:20 AM   Dressing Change Due 02/16/18 2/14/2018  9:00 AM   Site Change Due 02/16/18 2/14/2018  9:00 AM   Reason Not Rotated Not due 2/14/2018  9:00 AM   Number of days: 2       Prev airway: None documented.    Drips: None documented.      Patient Active Problem List   Diagnosis    Acute on chronic congestive heart failure    CAD (coronary artery disease)    Essential hypertension    Hyperlipemia    Anemia    Erythema of breast    Protein calorie malnutrition    Elevated LFTs    Acute on chronic respiratory failure with hypoxemia    Mild malnutrition    Pacemaker    History of lung cancer    S/P AVR (aortic valve replacement)    Weight loss    Atherosclerosis of aorta       Review of patient's  allergies indicates:   Allergen Reactions    Codeine        Current Inpatient Medications:    No current facility-administered medications on file prior to encounter.      No current outpatient prescriptions on file prior to encounter.       Past Surgical History:   Procedure Laterality Date    CARDIAC PACEMAKER PLACEMENT      CORONARY ARTERY BYPASS GRAFT      EYE SURGERY      LUNG REMOVAL, PARTIAL Left        Social History     Social History    Marital status:      Spouse name: N/A    Number of children: N/A    Years of education: N/A     Occupational History    Not on file.     Social History Main Topics    Smoking status: Former Smoker    Smokeless tobacco: Never Used    Alcohol use No    Drug use: No    Sexual activity: Not on file     Other Topics Concern    Not on file     Social History Narrative    No narrative on file       OBJECTIVE:     Vital Signs Range (Last 24H):  Temp:  [36.8 °C (98.2 °F)]   Pulse:  [60-74]   Resp:  [18-24]   BP: (131-162)/(62-88)   SpO2:  [94 %-100 %]       CBC:   Recent Labs      02/14/18   0710  02/15/18   0655   WBC  7.87  6.90   RBC  3.29*  3.03*   HGB  9.3*  8.8*   HCT  30.9*  28.1*   PLT  209  186   MCV  94  93   MCH  28.3  29.0   MCHC  30.1*  31.3*       CMP:   Recent Labs      02/14/18   0710  02/15/18   0655   NA  142  143   K  3.9  3.4*   CL  105  104   CO2  27  30*   BUN  39*  37*   CREATININE  1.3  1.2   GLU  150*  135*   MG  1.7  1.7   PHOS  3.2  3.1   CALCIUM  9.3  8.9   ALBUMIN  2.8*  2.6*   PROT  5.6*  5.2*   ALKPHOS  76  66   ALT  103*  89*   AST  48*  45*   BILITOT  1.0  0.9       INR:  Recent Labs      02/12/18   1754   INR  1.1       Diagnostic Studies: No relevant studies.    EKG (02/12/18):  Vent. Rate : 063 BPM     Atrial Rate : 220 BPM  P-R Int : 000 ms          QRS Dur : 182 ms  QT Int : 468 ms       P-R-T Axes : 000 -66 118 degrees  QTc Int : 478 ms    Ventricular-paced rhythm  Abnormal ECG  When compared with ECG of 03-JUL-2010  06:08,  Electronic ventricular pacemaker has replaced Sinus rhythm    2D ECHO:  Results for orders placed or performed during the hospital encounter of 02/12/18   2D echo with color flow doppler   Result Value Ref Range    EF 60 55 - 65    Mitral Valve Regurgitation MODERATE TO SEVERE (A)     Aortic Valve Regurgitation MILD     Aortic Valve Stenosis MODERATE TO SEVERE (A)     Est. PA Systolic Pressure 48.64 (A)     Tricuspid Valve Regurgitation MILD      CONCLUSIONS     1 - Upper limit of normal left ventricular enlargement.     2 - Normal left ventricular systolic function (EF 60-65%).     3 - Eccentric hypertrophy.     4 - Wall motion abnormalities.     5 - Right ventricle is upper limit of normal in size with not well seen systolic function.     6 - Biatrial enlargement.     7 - Aortic valve prosthesis, HILARY = 0.92 cm2, AVAi = 0.56 cm2/m2, peak velocity = 4.21 m/s, mean gradient = 46 mmHg.     8 - Mild aortic regurgitation.     9 - Moderate to severe mitral regurgitation.     10 - Mild tricuspid regurgitation.     11 - Increased central venous pressure.     12 - Pulmonary hypertension. The estimated PA systolic pressure is 49 mmHg.     ASSESSMENT/PLAN:       Anesthesia Evaluation    I have reviewed the Patient Summary Reports.     I have reviewed the Medications.     Review of Systems  Anesthesia Hx:  No problems with previous Anesthesia  Denies Family Hx of Anesthesia complications.   Denies Personal Hx of Anesthesia complications.   Social:  Non-Smoker, No Alcohol Use    Hematology/Oncology:         -- Anemia: Current/Recent Cancer. surgery   EENT/Dental:   denies chronic allergic rhinitis   Cardiovascular:   Exercise tolerance: good Hypertension Valvular problems/Murmurs Denies MI.  Denies CAD.   CABG/stent  Denies Dysrhythmias.  CHF Orthopnea PND AVILES ECG has been reviewed.    Pulmonary:   Denies Pneumonia Denies Asthma.  Denies Shortness of breath.  Denies Recent URI.    Renal/:   Denies Chronic Renal  Disease.     Hepatic/GI:   Denies PUD. Denies Hiatal Hernia.  Denies GERD.    Neurological:   Denies TIA. Denies CVA. Denies Seizures.    Endocrine:   Denies Diabetes.    Psych:   Denies Psychiatric History.          Physical Exam  General:  Well nourished    Airway/Jaw/Neck:  Airway Findings: Mouth Opening: Normal General Airway Assessment: Adult  Improves to I with phonation.  TM Distance: 4 - 6 cm     Eyes/Ears/Nose:  EYES/EARS/NOSE FINDINGS: Normal   Dental:  Dental Findings: In tact   Chest/Lungs:  Chest/Lungs Findings: Normal Respiratory Rate, Rales, Basilar     Heart/Vascular:  Heart Findings: Rate: Normal  Rhythm: Regular Rhythm  Heart Murmur  Systolic        Mental Status:  Mental Status Findings:  Cooperative         Anesthesia Plan  Type of Anesthesia, risks & benefits discussed:  Anesthesia Type:  general, MAC  Patient's Preference:   Intra-op Monitoring Plan: standard ASA monitors  Intra-op Monitoring Plan Comments:   Post Op Pain Control Plan: multimodal analgesia, IV/PO Opioids PRN and per primary service following discharge from PACU  Post Op Pain Control Plan Comments:   Induction:   IV  Beta Blocker:  Patient is not currently on a Beta-Blocker (No further documentation required).       Informed Consent: Patient understands risks and agrees with Anesthesia plan.  Questions answered. Anesthesia consent signed with patient.  ASA Score: 3     Day of Surgery Review of History & Physical:  There are no significant changes.  H&P update referred to the surgeon.         Ready For Surgery From Anesthesia Perspective.

## 2018-02-15 NOTE — ASSESSMENT & PLAN NOTE
S/p single vessel CABG at the time of AVR in 2003.   Patent graft by [facility] in Nov 2017. Otherwise non-obstructive CAD.  On ASA and statin. No b-blocker for unknown reason -- will defer to primary cardiologist.

## 2018-02-15 NOTE — PLAN OF CARE
Problem: Patient Care Overview  Goal: Plan of Care Review  Outcome: Revised  Plan of care discussed with patient. Patient is free of fall/trauma/injury. Denies CP, SOB, or pain/discomfort. 2g Mg replacement.  IVAN completed.  All questions addressed. Will continue to monitor

## 2018-02-15 NOTE — TRANSFER OF CARE
"Anesthesia Transfer of Care Note    Patient: Delmi Nunez    Procedure(s) Performed: Procedure(s) (LRB):  TRANSESOPHAGEAL ECHOCARDIOGRAM (IVAN) (N/A)    Patient location: PACU    Anesthesia Type: general    Transport from OR: Transported from OR on 6-10 L/min O2 by face mask with adequate spontaneous ventilation    Post pain: adequate analgesia    Post assessment: no apparent anesthetic complications and tolerated procedure well    Post vital signs: stable    Level of consciousness: sedated and responds to stimulation    Nausea/Vomiting: no nausea/vomiting    Complications: none    Transfer of care protocol was followed      Last vitals:   Visit Vitals  /62   Pulse 62   Temp 36.8 °C (98.2 °F) (Oral)   Resp (!) 23   Ht 5' 4" (1.626 m)   Wt 58.2 kg (128 lb 4.9 oz)   SpO2 100%   Breastfeeding? No   BMI 22.02 kg/m²     "

## 2018-02-15 NOTE — PROGRESS NOTES
[facility]  Interventional Cardiology  Progress Note    Patient Name: Delmi Nunez  MRN: [id number]  Admission Date: 2/12/2018  Hospital Length of Stay: 3 days  Code Status: Full Code   Attending Physician: Gisela Cobos MD   Primary Care Physician: Primary Doctor No  Principal Problem:Acute on chronic congestive heart failure    Subjective:     Interval History: No events overnight. No complaints this AM. Breathing is improved.       Objective:     Vital Signs (Most Recent):  Temp: 98.1 °F (36.7 °C) (02/15/18 1147)  Pulse: 64 (02/15/18 1456)  Resp: (!) 21 (02/15/18 1147)  BP: 126/61 (02/15/18 1147)  SpO2: 97 % (02/15/18 1147) Vital Signs (24h Range):  Temp:  [98 °F (36.7 °C)-98.2 °F (36.8 °C)] 98.1 °F (36.7 °C)  Pulse:  [60-73] 64  Resp:  [18-24] 21  SpO2:  [94 %-100 %] 97 %  BP: (103-162)/(54-88) 126/61     Weight: 58.2 kg (128 lb 4.9 oz)  Body mass index is 22.02 kg/m².    SpO2: 97 %  O2 Device (Oxygen Therapy): room air      Intake/Output Summary (Last 24 hours) at 02/15/18 1527  Last data filed at 02/15/18 1500   Gross per 24 hour   Intake             1210 ml   Output             2900 ml   Net            -1690 ml       Lines/Drains/Airways     Peripheral Intravenous Line                 Peripheral IV - Single Lumen 02/12/18 1754 Left Wrist 2 days                Physical Exam   Constitutional: She is oriented to person, place, and time. She appears well-developed and well-nourished. No distress.   HENT:   Head: Normocephalic and atraumatic.   Mouth/Throat: Oropharynx is clear and moist.   Eyes: Conjunctivae and EOM are normal. Pupils are equal, round, and reactive to light. No scleral icterus.   Neck: Neck supple. No JVD present. No tracheal deviation present.   Cardiovascular: Normal rate and regular rhythm.  Exam reveals no gallop and no friction rub.    Murmur heard.   Holosystolic murmur is present with a grade of 3/6  at the upper right sternal border radiating to the  neck  Pulmonary/Chest: Effort normal and breath sounds normal. No respiratory distress. She has no wheezes. She has no rales. She exhibits no tenderness.   Abdominal: Soft. Bowel sounds are normal. She exhibits no distension. There is no hepatosplenomegaly. There is no tenderness.   Musculoskeletal: She exhibits no edema or tenderness.   Neurological: She is alert and oriented to person, place, and time.   Skin: Skin is warm and dry. No rash noted. No erythema.   Psychiatric: She has a normal mood and affect. Her behavior is normal.       Significant Labs:   CMP   Recent Labs  Lab 02/14/18  0710 02/15/18  0655    143   K 3.9 3.4*    104   CO2 27 30*   * 135*   BUN 39* 37*   CREATININE 1.3 1.2   CALCIUM 9.3 8.9   PROT 5.6* 5.2*   ALBUMIN 2.8* 2.6*   BILITOT 1.0 0.9   ALKPHOS 76 66   AST 48* 45*   * 89*   ANIONGAP 10 9   ESTGFRAFRICA 44.5* 49.0*   EGFRNONAA 38.6* 42.5*    and CBC   Recent Labs  Lab 02/14/18  0710 02/15/18  0655   WBC 7.87 6.90   HGB 9.3* 8.8*   HCT 30.9* 28.1*    186        Ref. Range 2/15/2018 00:45 2/15/2018 06:55   Haptoglobin Latest Ref Range: 30 - 250 mg/dL <10 (L)    Retic Latest Ref Range: 0.5 - 2.5 %  2.8 (H)      Ref. Range 2/15/2018 00:45   LD Latest Ref Range: 110 - 260 U/L 470 (H)     Assessment and Plan:     Patient is a 83 y.o. female presenting with:    * Acute on chronic congestive heart failure    Diuresing well with IV Lasix 60mg TID.  -2.8 L  LFTs trending down. Weight down 2kg since yesterday.             Nonrheumatic aortic valve stenosis    HILARY = 0.92 cm2, AVAi = 0.56 cm2/m2, peak velocity = 4.21 m/s, mean gradient = 46 mmHg, EF= 60%.  Can proceed with TAVR work-up after her PVL closure.   She will need TAVR CTA, CTS consult, and PFTs.  STS= 9.9%  Frailty to be done on 2/16.          Non-rheumatic mitral regurgitation    IVAN shows tethered posterior leaflet with severe mitral regurgitation with a large centrally originating and posteriorly  "directed regugitant jet.  Likely ischemic MR (patient has SVG to RCA).        Paravalvular leak of prosthetic heart valve    IVAN today shows severe paravalvular aortic regurgitation with large, anterior PVL ( between LCC and RCC and under RCC). The regurgitant jet splits into two jets diagnoally across the LVOT.   Will schedule for outpatient PVL closure in the near future.         Atherosclerosis of aorta    Seen on outpatient IVAN (in Media tab).   On ASA and statin.         History of lung cancer    Follows with Dr. Berrios at .   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   Hx of multiple lung nodules.   PET in July 2017 shows "increase in RUL nodular lesion from 1.1 cm to 1.4 cm with increased SUV 8, increased R apical nodule from 1 cm to 1.5 cm without SUV uptake. There is also nodular focuse of increased SUV activity posterior to the descending aorta about 3.7."  Per Dr. Berrios's note (in Media Tab): "right upper lobe mass increase can be due to ongoing fibrosis (she has had 2 biopsies which are negative for malignancy). This should not impede her aortic valve repair. She will receive a chest CT in 3 months, even if she has a cancer in the RUL despite having 2 negative biopsies, she is still a candidate for stereotactic radiation surgery."        Pacemaker    Placed at time of AVR + CABG in 2003.   Stable.         Acute on chronic respiratory failure with hypoxemia    On Home O2 prn, started 1/29/18.   Currently on room air.         Protein calorie malnutrition    Albumin 2.8.   Nutrition following.         Erythema of breast    On doxycycline.         Hyperlipemia    Stable, on statin.         Essential hypertension    Controlled on lisinopril.         CAD (coronary artery disease)    S/p single vessel CABG at the time of AVR in 2003.   Patent graft by [facility] [place] in Nov 2017. Otherwise non-obstructive CAD.  On ASA and statin. No b-blocker for unknown " reason -- will defer to primary cardiologist.           PLAN:  She will be scheduled for outpatient PVL closure in the near future.   She can be discharged home when she is euvolemic.   She may need TAVR as well, but will defer TAVR work-up until after PVL closure.           VTE Risk Mitigation         Ordered     heparin (porcine) injection 5,000 Units  Every 8 hours     Route:  Subcutaneous        02/15/18 0915     Medium Risk of VTE  Once      02/12/18 2216     Place sequential compression device  Until discontinued      02/12/18 2216          Zara Melendez PA-C  Interventional Cardiology  [facility]  [phone]

## 2018-02-16 ENCOUNTER — ANESTHESIA (OUTPATIENT)
Dept: MEDSURG UNIT | Facility: HOSPITAL | Age: 83
End: 2018-02-16

## 2018-02-16 DIAGNOSIS — T82.03XD PARAVALVULAR LEAK OF PROSTHETIC HEART VALVE, SUBSEQUENT ENCOUNTER: Primary | ICD-10-CM

## 2018-02-16 LAB
ALBUMIN SERPL BCP-MCNC: 2.7 G/DL
ALP SERPL-CCNC: 61 U/L
ALT SERPL W/O P-5'-P-CCNC: 75 U/L
ANION GAP SERPL CALC-SCNC: 8 MMOL/L
ANISOCYTOSIS BLD QL SMEAR: SLIGHT
AST SERPL-CCNC: 38 U/L
BASOPHILS # BLD AUTO: 0.04 K/UL
BASOPHILS NFR BLD: 0.6 %
BILIRUB SERPL-MCNC: 1.1 MG/DL
BUN SERPL-MCNC: 34 MG/DL
BURR CELLS BLD QL SMEAR: ABNORMAL
CALCIUM SERPL-MCNC: 9.2 MG/DL
CHLORIDE SERPL-SCNC: 101 MMOL/L
CO2 SERPL-SCNC: 31 MMOL/L
CREAT SERPL-MCNC: 1.3 MG/DL
DIFFERENTIAL METHOD: ABNORMAL
EOSINOPHIL # BLD AUTO: 0.1 K/UL
EOSINOPHIL NFR BLD: 1.4 %
ERYTHROCYTE [DISTWIDTH] IN BLOOD BY AUTOMATED COUNT: 25.2 %
EST. GFR  (AFRICAN AMERICAN): 44.5 ML/MIN/1.73 M^2
EST. GFR  (NON AFRICAN AMERICAN): 38.6 ML/MIN/1.73 M^2
GLUCOSE SERPL-MCNC: 119 MG/DL
HCT VFR BLD AUTO: 29.1 %
HGB BLD-MCNC: 8.7 G/DL
HYPOCHROMIA BLD QL SMEAR: ABNORMAL
IMM GRANULOCYTES # BLD AUTO: 0.03 K/UL
IMM GRANULOCYTES NFR BLD AUTO: 0.5 %
LYMPHOCYTES # BLD AUTO: 0.6 K/UL
LYMPHOCYTES NFR BLD: 9.3 %
MAGNESIUM SERPL-MCNC: 2.2 MG/DL
MCH RBC QN AUTO: 27.6 PG
MCHC RBC AUTO-ENTMCNC: 29.9 G/DL
MCV RBC AUTO: 92 FL
MONOCYTES # BLD AUTO: 0.8 K/UL
MONOCYTES NFR BLD: 12.6 %
NEUTROPHILS # BLD AUTO: 5 K/UL
NEUTROPHILS NFR BLD: 75.6 %
NRBC BLD-RTO: 0 /100 WBC
OVALOCYTES BLD QL SMEAR: ABNORMAL
PHOSPHATE SERPL-MCNC: 2.7 MG/DL
PLATELET # BLD AUTO: 188 K/UL
PLATELET BLD QL SMEAR: ABNORMAL
PMV BLD AUTO: 11.4 FL
POIKILOCYTOSIS BLD QL SMEAR: SLIGHT
POLYCHROMASIA BLD QL SMEAR: ABNORMAL
POTASSIUM SERPL-SCNC: 3.7 MMOL/L
PROT SERPL-MCNC: 5.5 G/DL
RBC # BLD AUTO: 3.15 M/UL
SCHISTOCYTES BLD QL SMEAR: PRESENT
SODIUM SERPL-SCNC: 140 MMOL/L
WBC # BLD AUTO: 6.65 K/UL

## 2018-02-16 PROCEDURE — 80053 COMPREHEN METABOLIC PANEL: CPT

## 2018-02-16 PROCEDURE — 84100 ASSAY OF PHOSPHORUS: CPT

## 2018-02-16 PROCEDURE — 63600175 PHARM REV CODE 636 W HCPCS: Performed by: HOSPITALIST

## 2018-02-16 PROCEDURE — 25000003 PHARM REV CODE 250: Performed by: NURSE PRACTITIONER

## 2018-02-16 PROCEDURE — 11000001 HC ACUTE MED/SURG PRIVATE ROOM

## 2018-02-16 PROCEDURE — 99233 SBSQ HOSP IP/OBS HIGH 50: CPT | Mod: ,,, | Performed by: INTERNAL MEDICINE

## 2018-02-16 PROCEDURE — 99223 1ST HOSP IP/OBS HIGH 75: CPT | Mod: ,,, | Performed by: NURSE PRACTITIONER

## 2018-02-16 PROCEDURE — 25000003 PHARM REV CODE 250: Performed by: HOSPITALIST

## 2018-02-16 PROCEDURE — 85025 COMPLETE CBC W/AUTO DIFF WBC: CPT

## 2018-02-16 PROCEDURE — 99232 SBSQ HOSP IP/OBS MODERATE 35: CPT | Mod: ,,, | Performed by: HOSPITALIST

## 2018-02-16 PROCEDURE — 83735 ASSAY OF MAGNESIUM: CPT

## 2018-02-16 PROCEDURE — 36415 COLL VENOUS BLD VENIPUNCTURE: CPT

## 2018-02-16 RX ORDER — FUROSEMIDE 40 MG/1
40 TABLET ORAL 2 TIMES DAILY
Status: DISCONTINUED | OUTPATIENT
Start: 2018-02-16 | End: 2018-02-17 | Stop reason: HOSPADM

## 2018-02-16 RX ORDER — DIPHENHYDRAMINE HCL 50 MG
50 CAPSULE ORAL ONCE
Status: CANCELLED | OUTPATIENT
Start: 2018-02-16 | End: 2018-02-16

## 2018-02-16 RX ORDER — DEXTROSE MONOHYDRATE AND SODIUM CHLORIDE 5; .45 G/100ML; G/100ML
INJECTION, SOLUTION INTRAVENOUS CONTINUOUS
Status: CANCELLED | OUTPATIENT
Start: 2018-02-16

## 2018-02-16 RX ADMIN — HEPARIN SODIUM 5000 UNITS: 5000 INJECTION, SOLUTION INTRAVENOUS; SUBCUTANEOUS at 01:02

## 2018-02-16 RX ADMIN — HEPARIN SODIUM 5000 UNITS: 5000 INJECTION, SOLUTION INTRAVENOUS; SUBCUTANEOUS at 05:02

## 2018-02-16 RX ADMIN — DOXYCYCLINE HYCLATE 100 MG: 100 TABLET, COATED ORAL at 08:02

## 2018-02-16 RX ADMIN — SERTRALINE HYDROCHLORIDE 25 MG: 25 TABLET ORAL at 08:02

## 2018-02-16 RX ADMIN — FUROSEMIDE 60 MG: 10 INJECTION, SOLUTION INTRAMUSCULAR; INTRAVENOUS at 05:02

## 2018-02-16 RX ADMIN — ASPIRIN 81 MG: 81 TABLET, COATED ORAL at 08:02

## 2018-02-16 RX ADMIN — PANTOPRAZOLE SODIUM 40 MG: 40 TABLET, DELAYED RELEASE ORAL at 08:02

## 2018-02-16 RX ADMIN — FUROSEMIDE 40 MG: 40 TABLET ORAL at 05:02

## 2018-02-16 RX ADMIN — LISINOPRIL 20 MG: 20 TABLET ORAL at 08:02

## 2018-02-16 RX ADMIN — CLOPIDOGREL 75 MG: 75 TABLET, FILM COATED ORAL at 08:02

## 2018-02-16 RX ADMIN — HEPARIN SODIUM 5000 UNITS: 5000 INJECTION, SOLUTION INTRAVENOUS; SUBCUTANEOUS at 08:02

## 2018-02-16 RX ADMIN — Medication 1 CAPSULE: at 08:02

## 2018-02-16 RX ADMIN — ATORVASTATIN CALCIUM 80 MG: 20 TABLET, FILM COATED ORAL at 08:02

## 2018-02-16 NOTE — SUBJECTIVE & OBJECTIVE
No current facility-administered medications on file prior to encounter.      No current outpatient prescriptions on file prior to encounter.       Review of patient's allergies indicates:   Allergen Reactions    Codeine        Past Medical History:   Diagnosis Date    Abdominal aneurysm     Anemia     Bacterial meningitis     Cancer     lung cancer    CHF (congestive heart failure)     Coronary artery disease     Hyperlipidemia     Hypertension      Past Surgical History:   Procedure Laterality Date    CARDIAC PACEMAKER PLACEMENT      CORONARY ARTERY BYPASS GRAFT      EYE SURGERY      LUNG REMOVAL, PARTIAL Left      Family History     Problem Relation (Age of Onset)    Diabetes Mother    Heart failure Mother    Intracerebral hemorrhage Father        Social History Main Topics    Smoking status: Former Smoker    Smokeless tobacco: Never Used    Alcohol use No    Drug use: No    Sexual activity: Not on file     Review of Systems   Constitutional: Negative for activity change, appetite change, fatigue and fever.   HENT: Negative for congestion, dental problem, sneezing and sore throat.    Eyes: Negative for pain, discharge and visual disturbance.   Respiratory: Positive for shortness of breath. Negative for cough and wheezing.    Cardiovascular: Negative for chest pain, palpitations and leg swelling.   Gastrointestinal: Negative for abdominal distention, abdominal pain, constipation, diarrhea, nausea and vomiting.   Endocrine: Negative for polydipsia, polyphagia and polyuria.   Genitourinary: Negative for dysuria, frequency and urgency.   Musculoskeletal: Negative for back pain and gait problem.   Skin: Negative for rash and wound.   Neurological: Positive for dizziness and light-headedness. Negative for seizures, syncope and headaches.   Hematological: Does not bruise/bleed easily.   Psychiatric/Behavioral: Negative for agitation and confusion. The patient is not nervous/anxious.      Objective:      Vital Signs (Most Recent):  Temp: 98.4 °F (36.9 °C) (02/16/18 1539)  Pulse: 60 (02/16/18 1539)  Resp: (!) 27 (02/16/18 1539)  BP: (!) 142/69 (02/16/18 1539)  SpO2: 96 % (02/16/18 1539) Vital Signs (24h Range):  Temp:  [97.9 °F (36.6 °C)-98.7 °F (37.1 °C)] 98.4 °F (36.9 °C)  Pulse:  [60-63] 60  Resp:  [15-29] 27  SpO2:  [95 %-100 %] 96 %  BP: (100-142)/(49-69) 142/69     Weight: 58 kg (127 lb 13.9 oz)  Body mass index is 21.95 kg/m².    SpO2: 96 %  O2 Device (Oxygen Therapy): room air     Intake/Output - Last 3 Shifts       02/14 0700 - 02/15 0659 02/15 0700 - 02/16 0659 02/16 0700 - 02/17 0659    P.O. 880 900 710    I.V. (mL/kg)  300 (5.2)     Total Intake(mL/kg) 880 (14.6) 1200 (20.6) 710 (12.2)    Urine (mL/kg/hr) 2400 (1.7) 1900 (1.4) 350 (0.6)    Stool  0 (0)     Total Output 2400 1900 350    Net -1520 -700 +360           Urine Occurrence  1 x     Stool Occurrence  0 x            Lines/Drains/Airways     Peripheral Intravenous Line                 Peripheral IV - Single Lumen 02/12/18 1754 Left Wrist 3 days                 STS Risk Score: 6.66%    Physical Exam   Constitutional: She is oriented to person, place, and time. She appears well-developed and well-nourished.   HENT:   Head: Normocephalic and atraumatic.   Eyes: Pupils are equal, round, and reactive to light.   Neck: Normal range of motion. Neck supple.   Cardiovascular: Normal rate and regular rhythm.    Murmur heard.  Pulmonary/Chest: Effort normal and breath sounds normal.   Abdominal: Soft. Bowel sounds are normal.   Musculoskeletal: Normal range of motion.   Neurological: She is alert and oriented to person, place, and time.   Skin: Skin is warm and dry.   Psychiatric: She has a normal mood and affect. Her behavior is normal.       Significant Labs:  CBC:   Recent Labs  Lab 02/16/18  0653   WBC 6.65   RBC 3.15*   HGB 8.7*   HCT 29.1*      MCV 92   MCH 27.6   MCHC 29.9*     CMP:   Recent Labs  Lab 02/16/18  0653   *   CALCIUM 9.2    ALBUMIN 2.7*   PROT 5.5*      K 3.7   CO2 31*      BUN 34*   CREATININE 1.3   ALKPHOS 61   ALT 75*   AST 38   BILITOT 1.1*       Significant Diagnostics:    IVAN 2/16/18   1 - Left atrial appendage is bilobed with no visualized thrombus.     2 - Biatrial enlargement.     3 - Normal left ventricular systolic function (EF 60-65%).     4 - Severe aortic regurgitation.     5 - The mitral valve is mildly sclerotic with evidence of tethered posterior leaflet.     6 - Severe mitral regurgitation.     7 - Aortic valve prosthesis.     8 - Severe paravalvular aortic regurgitation ( see text).     9 - Mild tricuspid regurgitation.     10 - Grade 2 atheroma disease of aorta.     2D ECHO 2/14/18   1 - Upper limit of normal left ventricular enlargement.     2 - Normal left ventricular systolic function (EF 60-65%).     3 - Eccentric hypertrophy.     4 - Wall motion abnormalities.     5 - Right ventricle is upper limit of normal in size with not well seen systolic function.     6 - Biatrial enlargement.     7 - Aortic valve prosthesis, HILARY = 0.92 cm2, AVAi = 0.56 cm2/m2, peak velocity = 4.21 m/s, mean gradient = 46 mmHg.     8 - Mild aortic regurgitation.     9 - Moderate to severe mitral regurgitation.     10 - Mild tricuspid regurgitation.     11 - Increased central venous pressure.     12 - Pulmonary hypertension. The estimated PA systolic pressure is 49 mmHg.

## 2018-02-16 NOTE — PLAN OF CARE
Problem: Patient Care Overview  Goal: Plan of Care Review  Outcome: Revised  Plan of care discussed with patient. Patient is free of fall/trauma/injury. Denies CP, SOB, or pain/discomfort. Transitioned to PO Lasix.  All questions addressed. Will continue to monitor

## 2018-02-16 NOTE — ASSESSMENT & PLAN NOTE
Patient is considered high risk for SAVR due to age, redo status and history of lung CA. Please proceed with TAVR eval. Dr. Steiner to staff.

## 2018-02-16 NOTE — ASSESSMENT & PLAN NOTE
HILARY = 0.92 cm2, AVAi = 0.56 cm2/m2, peak velocity = 4.21 m/s, mean gradient = 46 mmHg, EF= 60%.  Can proceed with TAVR work-up after her PVL closure.   She will need TAVR CTA and PFTs.  High risk per Dr. Witt due to age, redo status and history of lung CA.   STS= 9.9%  Frailty: 2/4 (albumin and walk)

## 2018-02-16 NOTE — SUBJECTIVE & OBJECTIVE
Interval History: Feels much better today. Complaining of cramping in legs. No events overnight.     Objective:     Vital Signs (Most Recent):  Temp: 98.4 °F (36.9 °C) (02/16/18 1539)  Pulse: 60 (02/16/18 1539)  Resp: (!) 27 (02/16/18 1539)  BP: (!) 142/69 (02/16/18 1539)  SpO2: 96 % (02/16/18 1539) Vital Signs (24h Range):  Temp:  [97.9 °F (36.6 °C)-98.7 °F (37.1 °C)] 98.4 °F (36.9 °C)  Pulse:  [60-63] 60  Resp:  [15-29] 27  SpO2:  [95 %-100 %] 96 %  BP: (100-142)/(49-69) 142/69     Weight: 58 kg (127 lb 13.9 oz)  Body mass index is 21.95 kg/m².    SpO2: 96 %  O2 Device (Oxygen Therapy): room air      Intake/Output Summary (Last 24 hours) at 02/16/18 1655  Last data filed at 02/16/18 1300   Gross per 24 hour   Intake             1130 ml   Output             1150 ml   Net              -20 ml       Lines/Drains/Airways     Peripheral Intravenous Line                 Peripheral IV - Single Lumen 02/12/18 1754 Left Wrist 3 days                Physical Exam   Constitutional: She is oriented to person, place, and time. She appears well-developed and well-nourished. No distress.   HENT:   Head: Normocephalic and atraumatic.   Mouth/Throat: Oropharynx is clear and moist.   Eyes: Conjunctivae and EOM are normal. Pupils are equal, round, and reactive to light. No scleral icterus.   Neck: Neck supple. No JVD present. No tracheal deviation present.   Cardiovascular: Normal rate and regular rhythm.  Exam reveals no gallop and no friction rub.    Murmur heard.   Holosystolic murmur is present with a grade of 3/6  at the upper right sternal border radiating to the neck  Pulmonary/Chest: Effort normal. No respiratory distress. She has decreased breath sounds in the right lower field and the left lower field. She has no wheezes. She has no rales. She exhibits no tenderness.   Abdominal: Soft. Bowel sounds are normal. She exhibits no distension. There is no hepatosplenomegaly. There is no tenderness.   Musculoskeletal: She exhibits  no edema or tenderness.   Neurological: She is alert and oriented to person, place, and time.   Skin: Skin is warm and dry. No rash noted. No erythema.   Psychiatric: She has a normal mood and affect. Her behavior is normal.     Significant Labs:   BMP:   Recent Labs  Lab 02/15/18  0655 02/16/18  0653   * 119*    140   K 3.4* 3.7    101   CO2 30* 31*   BUN 37* 34*   CREATININE 1.2 1.3   CALCIUM 8.9 9.2   MG 1.7 2.2    and CBC   Recent Labs  Lab 02/15/18  0655 02/16/18  0653   WBC 6.90 6.65   HGB 8.8* 8.7*   HCT 28.1* 29.1*    188

## 2018-02-16 NOTE — CONSULTS
"[facility]  Cardiothoracic Surgery  Consult Note    Patient Name: Delmi Nunez  MRN: [id number]  Admission Date: 2/12/2018  Attending Physician: Gisela Cobos MD  Referring Provider: Self, Aaareferral    Patient information was obtained from patient, past medical records and ER records.     Inpatient consult to Cardiothoracic Surgery  Consult performed by: SETH HOBBS  Consult ordered by: KIARA FAY  Reason for consult: AVR        Subjective:     Principal Problem: Acute on chronic congestive heart failure    History of Present Illness: Mrs. Nunez is a 83 year old female who p[resent to [facility] for evaluation for surgical AVR for her stenosis bioprosthetic valve.  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 She states she felt "great" after her initial valve replacement/CABG in 2003 -- she was very active around the house and could do all of her activities without difficulty. About 5-6 months ago, she began to develop AVILES. She was admitted to  in September with decompensated heart failure and anemia. She was transfused, diuresed, and discharged home. She was admitted to [facility] again with decompensated heart failure and discharged on 1/29/18 with oxygen 2L/NC.   She had noted no improvement in her AVILES since her discharge 2 weeks ago. She has PND, orthopnea, and LE edema. She becomes SOB ambulating to her bathroom in the house.      Since admission, her SOB and LE edema have improved, though she continues to have orthopnea and can not lie flat.      [facility] in Nov 2017 showed widely patent SVG to RCA and otherwise " non-obstructive CAD. IVAN on 11/2017 showed moderate perivalvular leak. She was subsequently referred for consideration of PVL closure. She was scheduled for IVAN on 2/16 and clinic appointment on 2/26.     No current facility-administered medications on file prior to encounter.      No current outpatient prescriptions on file prior to encounter.       Review of patient's allergies indicates:   Allergen Reactions    Codeine        Past Medical History:   Diagnosis Date    Abdominal aneurysm     Anemia     Bacterial meningitis     Cancer     lung cancer    CHF (congestive heart failure)     Coronary artery disease     Hyperlipidemia     Hypertension      Past Surgical History:   Procedure Laterality Date    CARDIAC PACEMAKER PLACEMENT      CORONARY ARTERY BYPASS GRAFT      EYE SURGERY      LUNG REMOVAL, PARTIAL Left      Family History     Problem Relation (Age of Onset)    Diabetes Mother    Heart failure Mother    Intracerebral hemorrhage Father        Social History Main Topics    Smoking status: Former Smoker    Smokeless tobacco: Never Used    Alcohol use No    Drug use: No    Sexual activity: Not on file     Review of Systems   Constitutional: Negative for activity change, appetite change, fatigue and fever.   HENT: Negative for congestion, dental problem, sneezing and sore throat.    Eyes: Negative for pain, discharge and visual disturbance.   Respiratory: Positive for shortness of breath. Negative for cough and wheezing.    Cardiovascular: Negative for chest pain, palpitations and leg swelling.   Gastrointestinal: Negative for abdominal distention, abdominal pain, constipation, diarrhea, nausea and vomiting.   Endocrine: Negative for polydipsia, polyphagia and polyuria.   Genitourinary: Negative for dysuria, frequency and urgency.   Musculoskeletal: Negative for back pain and gait problem.   Skin: Negative for rash and wound.   Neurological: Positive for dizziness and light-headedness.  Negative for seizures, syncope and headaches.   Hematological: Does not bruise/bleed easily.   Psychiatric/Behavioral: Negative for agitation and confusion. The patient is not nervous/anxious.      Objective:     Vital Signs (Most Recent):  Temp: 98.4 °F (36.9 °C) (02/16/18 1539)  Pulse: 60 (02/16/18 1539)  Resp: (!) 27 (02/16/18 1539)  BP: (!) 142/69 (02/16/18 1539)  SpO2: 96 % (02/16/18 1539) Vital Signs (24h Range):  Temp:  [97.9 °F (36.6 °C)-98.7 °F (37.1 °C)] 98.4 °F (36.9 °C)  Pulse:  [60-63] 60  Resp:  [15-29] 27  SpO2:  [95 %-100 %] 96 %  BP: (100-142)/(49-69) 142/69     Weight: 58 kg (127 lb 13.9 oz)  Body mass index is 21.95 kg/m².    SpO2: 96 %  O2 Device (Oxygen Therapy): room air     Intake/Output - Last 3 Shifts       02/14 0700 - 02/15 0659 02/15 0700 - 02/16 0659 02/16 0700 - 02/17 0659    P.O. 880 900 710    I.V. (mL/kg)  300 (5.2)     Total Intake(mL/kg) 880 (14.6) 1200 (20.6) 710 (12.2)    Urine (mL/kg/hr) 2400 (1.7) 1900 (1.4) 350 (0.6)    Stool  0 (0)     Total Output 2400 1900 350    Net -1520 -700 +360           Urine Occurrence  1 x     Stool Occurrence  0 x            Lines/Drains/Airways     Peripheral Intravenous Line                 Peripheral IV - Single Lumen 02/12/18 1754 Left Wrist 3 days                 STS Risk Score: 6.66%    Physical Exam   Constitutional: She is oriented to person, place, and time. She appears well-developed and well-nourished.   HENT:   Head: Normocephalic and atraumatic.   Eyes: Pupils are equal, round, and reactive to light.   Neck: Normal range of motion. Neck supple.   Cardiovascular: Normal rate and regular rhythm.    Murmur heard.  Pulmonary/Chest: Effort normal and breath sounds normal.   Abdominal: Soft. Bowel sounds are normal.   Musculoskeletal: Normal range of motion.   Neurological: She is alert and oriented to person, place, and time.   Skin: Skin is warm and dry.   Psychiatric: She has a normal mood and affect. Her behavior is normal.        Significant Labs:  CBC:   Recent Labs  Lab 02/16/18  0653   WBC 6.65   RBC 3.15*   HGB 8.7*   HCT 29.1*      MCV 92   MCH 27.6   MCHC 29.9*     CMP:   Recent Labs  Lab 02/16/18  0653   *   CALCIUM 9.2   ALBUMIN 2.7*   PROT 5.5*      K 3.7   CO2 31*      BUN 34*   CREATININE 1.3   ALKPHOS 61   ALT 75*   AST 38   BILITOT 1.1*       Significant Diagnostics:    IVAN 2/16/18   1 - Left atrial appendage is bilobed with no visualized thrombus.     2 - Biatrial enlargement.     3 - Normal left ventricular systolic function (EF 60-65%).     4 - Severe aortic regurgitation.     5 - The mitral valve is mildly sclerotic with evidence of tethered posterior leaflet.     6 - Severe mitral regurgitation.     7 - Aortic valve prosthesis.     8 - Severe paravalvular aortic regurgitation ( see text).     9 - Mild tricuspid regurgitation.     10 - Grade 2 atheroma disease of aorta.     2D ECHO 2/14/18   1 - Upper limit of normal left ventricular enlargement.     2 - Normal left ventricular systolic function (EF 60-65%).     3 - Eccentric hypertrophy.     4 - Wall motion abnormalities.     5 - Right ventricle is upper limit of normal in size with not well seen systolic function.     6 - Biatrial enlargement.     7 - Aortic valve prosthesis, HILARY = 0.92 cm2, AVAi = 0.56 cm2/m2, peak velocity = 4.21 m/s, mean gradient = 46 mmHg.     8 - Mild aortic regurgitation.     9 - Moderate to severe mitral regurgitation.     10 - Mild tricuspid regurgitation.     11 - Increased central venous pressure.     12 - Pulmonary hypertension. The estimated PA systolic pressure is 49 mmHg.     Assessment/Plan:     NYHA Score: NYHA III: marked limitation of physical activity, comfortable at rest    Paravalvular leak (prosthetic valve)    Mrs. Nunez is a 83 year old female who p[resent to [facility] for evaluation for surgical AVR for her stenosis bioprosthetic valve.  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     Patient is considered high risk for SAVR due to age, redo status and history of lung CA. Please proceed with TAVR eval. Dr. Steiner to staff.             Thank you for your consult. I will sign off. Please contact us if you have any additional questions.    Myra Merino NP  Cardiothoracic Surgery  [facility] Attending Note:    I have personally taken the history and examined this patient and agree with the NP's note as stated above. She is high risk for surgery given comorbid conditions and frailty. Recommend perivalvular leak closure +/- DEREK.

## 2018-02-16 NOTE — PROGRESS NOTES
Patient sitting up in recliner with daughter at bedside. Both agreeable to discuss educational blue folder. We reviewed the HF Zones sheet at length (daily weights, sodium / fluid restrictions, medication regimen, signs and symptoms). Discussed patients dietary intake (heavy in processed meats, prepackaged foods) and emphasized the importance of incorporating more fresh foods into her diet. Reviewed 3 AHA salt education sheets, 7 day sample menu with grocery tips, seasoning options, and homemade dressing options. Addressed patient and daughters questions and concerns to their satisfaction.     Due to need for PVL, TAVR in the near future, not a candidate this admission for [facility]. Patient and daughter verbalized understanding. Should she be readmitted for CHF exacerbation following TAVR, RN will reassess eligibility for enrollment.    Removed from hf list.

## 2018-02-16 NOTE — ASSESSMENT & PLAN NOTE
IVAN shows severe paravalvular aortic regurgitation with large, anterior PVL ( between LCC and RCC and under RCC). The regurgitant jet splits into two jets diagnoally across the LVOT.   Will schedule for outpatient PVL closure on 4/21/18.

## 2018-02-16 NOTE — HPI
"Mrs. Nunez is a 83 year old female who p[resent to [facility] for evaluation for surgical AVR for her stenosis bioprosthetic valve.  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 She states she felt "great" after her initial valve replacement/CABG in 2003 -- she was very active around the house and could do all of her activities without difficulty. About 5-6 months ago, she began to develop AVILES. She was admitted to  in September with decompensated heart failure and anemia. She was transfused, diuresed, and discharged home. She was admitted to [facility] again with decompensated heart failure and discharged on 1/29/18 with oxygen 2L/NC.   She had noted no improvement in her AVILES since her discharge 2 weeks ago. She has PND, orthopnea, and LE edema. She becomes SOB ambulating to her bathroom in the house.      Since admission, her SOB and LE edema have improved, though she continues to have orthopnea and can not lie flat.      [facility] in Nov 2017 showed widely patent SVG to RCA and otherwise non-obstructive CAD. IVAN on 11/2017 showed moderate perivalvular leak. She was subsequently referred for consideration of PVL closure. She was scheduled for IVAN on 2/16 and clinic appointment on 2/26.   "

## 2018-02-16 NOTE — PROGRESS NOTES
[facility]  Interventional Cardiology  Progress Note    Patient Name: Delmi Nunez  MRN: [id number]  Admission Date: 2/12/2018  Hospital Length of Stay: 4 days  Code Status: Full Code   Attending Physician: Gisela Cobos MD   Primary Care Physician: Primary Doctor No  Principal Problem:Acute on chronic congestive heart failure    Subjective:     Interval History: Feels much better today. Complaining of cramping in legs. No events overnight.     Objective:     Vital Signs (Most Recent):  Temp: 98.4 °F (36.9 °C) (02/16/18 1539)  Pulse: 60 (02/16/18 1539)  Resp: (!) 27 (02/16/18 1539)  BP: (!) 142/69 (02/16/18 1539)  SpO2: 96 % (02/16/18 1539) Vital Signs (24h Range):  Temp:  [97.9 °F (36.6 °C)-98.7 °F (37.1 °C)] 98.4 °F (36.9 °C)  Pulse:  [60-63] 60  Resp:  [15-29] 27  SpO2:  [95 %-100 %] 96 %  BP: (100-142)/(49-69) 142/69     Weight: 58 kg (127 lb 13.9 oz)  Body mass index is 21.95 kg/m².    SpO2: 96 %  O2 Device (Oxygen Therapy): room air      Intake/Output Summary (Last 24 hours) at 02/16/18 1655  Last data filed at 02/16/18 1300   Gross per 24 hour   Intake             1130 ml   Output             1150 ml   Net              -20 ml       Lines/Drains/Airways     Peripheral Intravenous Line                 Peripheral IV - Single Lumen 02/12/18 1754 Left Wrist 3 days                Physical Exam   Constitutional: She is oriented to person, place, and time. She appears well-developed and well-nourished. No distress.   HENT:   Head: Normocephalic and atraumatic.   Mouth/Throat: Oropharynx is clear and moist.   Eyes: Conjunctivae and EOM are normal. Pupils are equal, round, and reactive to light. No scleral icterus.   Neck: Neck supple. No JVD present. No tracheal deviation present.   Cardiovascular: Normal rate and regular rhythm.  Exam reveals no gallop and no friction rub.    Murmur heard.   Holosystolic murmur is present with a grade of 3/6  at the upper right sternal border radiating to  the neck  Pulmonary/Chest: Effort normal. No respiratory distress. She has decreased breath sounds in the right lower field and the left lower field. She has no wheezes. She has no rales. She exhibits no tenderness.   Abdominal: Soft. Bowel sounds are normal. She exhibits no distension. There is no hepatosplenomegaly. There is no tenderness.   Musculoskeletal: She exhibits no edema or tenderness.   Neurological: She is alert and oriented to person, place, and time.   Skin: Skin is warm and dry. No rash noted. No erythema.   Psychiatric: She has a normal mood and affect. Her behavior is normal.     Significant Labs:   BMP:   Recent Labs  Lab 02/15/18  0655 02/16/18  0653   * 119*    140   K 3.4* 3.7    101   CO2 30* 31*   BUN 37* 34*   CREATININE 1.2 1.3   CALCIUM 8.9 9.2   MG 1.7 2.2    and CBC   Recent Labs  Lab 02/15/18  0655 02/16/18  0653   WBC 6.90 6.65   HGB 8.8* 8.7*   HCT 28.1* 29.1*    188     Assessment and Plan:     Patient is a 83 y.o. female presenting with:    * Acute on chronic congestive heart failure    Diuresing well.   Weight down 3.5 lbs.   Transitioned to PO Lasix today.                 Nonrheumatic aortic valve stenosis    HILARY = 0.92 cm2, AVAi = 0.56 cm2/m2, peak velocity = 4.21 m/s, mean gradient = 46 mmHg, EF= 60%.  Can proceed with TAVR work-up after her PVL closure.   She will need TAVR CTA and PFTs.  High risk per Dr. Witt due to age, redo status and history of lung CA.   STS= 9.9%  Frailty: 2/4 (albumin and walk)          Non-rheumatic mitral regurgitation    IVAN shows tethered posterior leaflet with severe mitral regurgitation with a large centrally originating and posteriorly directed regugitant jet.  Likely ischemic MR (patient has SVG to RCA).        Paravalvular leak of prosthetic heart valve    IVAN shows severe paravalvular aortic regurgitation with large, anterior PVL ( between LCC and RCC and under RCC). The regurgitant jet splits into two jets  "diagnoally across the LVOT.   Will schedule for outpatient PVL closure on 4/21/18.         Atherosclerosis of aorta    Seen on outpatient IVAN (in Media tab).   On ASA and statin.         History of lung cancer    Follows with Dr. Berrios at .   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   Hx of multiple lung nodules.   PET in July 2017 shows "increase in RUL nodular lesion from 1.1 cm to 1.4 cm with increased SUV 8, increased R apical nodule from 1 cm to 1.5 cm without SUV uptake. There is also nodular focuse of increased SUV activity posterior to the descending aorta about 3.7."  Per Dr. Berrios's note (in Media Tab): "right upper lobe mass increase can be due to ongoing fibrosis (she has had 2 biopsies which are negative for malignancy). This should not impede her aortic valve repair. She will receive a chest CT in 3 months, even if she has a cancer in the RUL despite having 2 negative biopsies, she is still a candidate for stereotactic radiation surgery."        Pacemaker    Placed at time of AVR + CABG in 2003.   Stable.         Acute on chronic respiratory failure with hypoxemia    On Home O2 prn, started 1/29/18.   Currently on room air.         Protein calorie malnutrition    Albumin 2.8.   Frailty 2/4.  Nutrition following.         Erythema of breast    On doxycycline.         Anemia    Labs c/w hemolysis.             Hyperlipemia    Stable, on statin.         Essential hypertension    Controlled on lisinopril.         CAD (coronary artery disease)    S/p single vessel CABG at the time of AVR in 2003.   Patent graft by [facility] in Nov 2017. Otherwise non-obstructive CAD.  On ASA and statin. No b-blocker for unknown reason -- will defer to primary cardiologist.             VTE Risk Mitigation         Ordered     heparin (porcine) injection 5,000 Units  Every 8 hours     Route:  Subcutaneous        02/15/18 0915     Medium Risk of VTE  Once      " 02/12/18 2216     Place sequential compression device  Until discontinued      02/12/18 2216           She will be scheduled for PVL closure on 4/21/18.  Risks, Benefits, and alternatives of cardiac catheterization and possible intervention were discussed in detail with the patient. Questions were answered. She is agreeable to proceed. Informed consent obtained.   On ASA.   Can consider Valve-in-valve TAVR if she remains symptomatic after PVL closure.   She will need TAVR CTA and PFTs to complete her TAVR work-up.     Ok to discharge when euvolemic. Will sign off. Please contact us with any additional questions.     Zara Melendez PA-C  Interventional Cardiology  [facility]  [phone]

## 2018-02-16 NOTE — ASSESSMENT & PLAN NOTE
S/p single vessel CABG at the time of AVR in 2003.   Patent graft by [facility] in Nov 2017. Otherwise non-obstructive CAD.  On ASA and statin. No b-blocker for unknown reason -- will defer to primary cardiologist.

## 2018-02-16 NOTE — PROGRESS NOTES
Progress Note   [facility] Medicine - [facility]       Team: [facility]OSP MED C Gisela Cobos MD  Admit Date: 2/12/2018  Length of Stay:  LOS: 4 days   LUISITO 2/19/2018  Principal Problem:  Acute on chronic congestive heart failure    HPI:  Mrs. Nunez is a pleasant 82yo  female with history of CHF (unknown details), chronic resp failure on home O2 2L x 2 weeks, HTN, CAD (?details), HTN, HLD, AAA (?details), h/o lung cancer (adenocarcinoma, ?carcinoid) s/p wedge and partial lobectomy '15, and anemia who presented to ED with shortness of breath with activity and at rest and B LE edema.  She was recently admitted to [facility] for same complaints and was discharged on 1/29/18 with home oxygen 2L/NC.  She states her Lasix has recently been increased from 20mg once daily to 40mg once daily within last week. She denies CP, cough, fever, chills, headache, and sputum production. She also reports redness of left breast and bruising of her left foot x 1 day.  She states she noticed these this morning when she woke up.  She denies numbness, pain, or trauma to either area; denies discharge from left breast    Hospital Course: Admitted to [facility], started on Lasix 40 IV BID. net -780 cc x 12 hours, continues with AVILES. The next day, net -625cc, weight unchanged, increased Lasix to 60 IV TID. Valve team consulted. 2D Echo with CFD CONCLUSIONS     1 - Upper limit of normal left ventricular enlargement.     2 - Normal left ventricular systolic function (EF 60-65%).     3 - Eccentric hypertrophy.     4 - Wall motion abnormalities.     5 - Right ventricle is upper limit of normal in size with not well seen systolic function.     6 - Biatrial enlargement.     7 - Moderate to severe aortic stenosis, HILARY = 0.92 cm2, AVAi = 0.56 cm2/m2, peak velocity = 4.21 m/s, mean gradient = 46 mmHg.     8 - Mild aortic regurgitation.     9 - Moderate to severe mitral regurgitation.     10 - Mild tricuspid regurgitation.     11 - Increased central venous  "pressure.     12 - Pulmonary hypertension. The estimated PA systolic pressure is 49 mmHg.   Improved diuresis, net -2000.  Weight decreased to 128 from 132. C/O B leg cramps. IVAN done and revealed "severe paravalvular aortic regurgitation. There is a bioprosthetic aortic valve with large, anterior PVL ( between LCC and RCC and under RCC).  The regurgitant jet splits into two jets diagnoally across the LVOT."  Valve team plans for outpatient PVL closure in the near future, then TAVR workup.  Continue diuresis, have a lot of fluid still to diurese, aim for d/c home Monday      Interval hx / overnight events: none. Doing great.  Approaching euvolemia so transition to oral lasix with possible d/c home tomorrow if no re-accumulation of fluid. CTS consult per Valve/Interventinal recs. Scheduled for PVL closure next Weds.    Areas of concern/ handoff: none    ROS:  Pain Scale: 0 /10   Constitutional: no fever or chills  Respiratory: no cough; no SOB  Cardiovascular: no chest pain or palpitations  Gastrointestinal: no nausea or vomiting, no abdominal pain or change in bowel habits  Genitourinary: no hematuria or dysuria  Integument/Breast: no rash or pruritis  Hematologic/Lymphatic: no easy bruising or lymphadenopathy  Musculoskeletal: no arthralgias or myalgias  Neurological: no seizures or tremors  Behavioral/Psych: no depression or anxiety      Vital Signs Range (Last 24H):  Temp:  [98.1 °F (36.7 °C)-98.7 °F (37.1 °C)]   Pulse:  [60-64]   Resp:  [15-23]   BP: (100-132)/(49-64)   SpO2:  [95 %-100 %]     I & O (Last 24H):    Intake/Output Summary (Last 24 hours) at 02/16/18 1119  Last data filed at 02/16/18 0800   Gross per 24 hour   Intake             1240 ml   Output             1050 ml   Net              190 ml       Physical Exam:  General appearance: no distress, pale, elderly, well-groomed  Mental status: Alert and oriented x 3, pleasant  HEENT:  conjunctivae/corneas clear, PERRL  Neck: supple, thyroid not enlarged, " no JVD  Pulm:   normal respiratory effort, CTA B, no c/w/r  Card: RRR, S1, S2 normal, +systolic murmur, no click, rub or gallop  Abd: soft, NT, ND, BS present; no masses, no organomegaly  Ext: 1+ edema to B mid-shin - improved  Pulses: 2+, symmetric  Skin: color, texture, turgor normal. L breast +erythema 10cm around nipple (similar to prior), but edema improved from prior  Neuro: CN II-XII grossly intact, no focal numbness or weakness, normal strength and tone     Diagnostic Results:  Labs reviewed    Recent Results (from the past 24 hour(s))   Transesophageal echo    Collection Time: 02/15/18  3:00 PM   Result Value Ref Range    EF 60 55 - 65    Mitral Valve Regurgitation SEVERE (A)     Aortic Valve Regurgitation SEVERE (A)     Mitral Valve Mobility TETHERED POSTERIOR LEAFLET     Tricuspid Valve Regurgitation MILD     Aortic Atheroma Yes (A)    Comprehensive Metabolic Panel (CMP)    Collection Time: 02/16/18  6:53 AM   Result Value Ref Range    Sodium 140 136 - 145 mmol/L    Potassium 3.7 3.5 - 5.1 mmol/L    Chloride 101 95 - 110 mmol/L    CO2 31 (H) 23 - 29 mmol/L    Glucose 119 (H) 70 - 110 mg/dL    BUN, Bld 34 (H) 8 - 23 mg/dL    Creatinine 1.3 0.5 - 1.4 mg/dL    Calcium 9.2 8.7 - 10.5 mg/dL    Total Protein 5.5 (L) 6.0 - 8.4 g/dL    Albumin 2.7 (L) 3.5 - 5.2 g/dL    Total Bilirubin 1.1 (H) 0.1 - 1.0 mg/dL    Alkaline Phosphatase 61 55 - 135 U/L    AST 38 10 - 40 U/L    ALT 75 (H) 10 - 44 U/L    Anion Gap 8 8 - 16 mmol/L    eGFR if African American 44.5 (A) >60 mL/min/1.73 m^2    eGFR if non  38.6 (A) >60 mL/min/1.73 m^2   Magnesium    Collection Time: 02/16/18  6:53 AM   Result Value Ref Range    Magnesium 2.2 1.6 - 2.6 mg/dL   Phosphorus    Collection Time: 02/16/18  6:53 AM   Result Value Ref Range    Phosphorus 2.7 2.7 - 4.5 mg/dL   CBC with Automated Differential    Collection Time: 02/16/18  6:53 AM   Result Value Ref Range    WBC 6.65 3.90 - 12.70 K/uL    RBC 3.15 (L) 4.00 - 5.40 M/uL     Hemoglobin 8.7 (L) 12.0 - 16.0 g/dL    Hematocrit 29.1 (L) 37.0 - 48.5 %    MCV 92 82 - 98 fL    MCH 27.6 27.0 - 31.0 pg    MCHC 29.9 (L) 32.0 - 36.0 g/dL    RDW 25.2 (H) 11.5 - 14.5 %    Platelets 188 150 - 350 K/uL    MPV 11.4 9.2 - 12.9 fL    Immature Granulocytes 0.5 0.0 - 0.5 %    Gran # (ANC) 5.0 1.8 - 7.7 K/uL    Immature Grans (Abs) 0.03 0.00 - 0.04 K/uL    Lymph # 0.6 (L) 1.0 - 4.8 K/uL    Mono # 0.8 0.3 - 1.0 K/uL    Eos # 0.1 0.0 - 0.5 K/uL    Baso # 0.04 0.00 - 0.20 K/uL    nRBC 0 0 /100 WBC    Gran% 75.6 (H) 38.0 - 73.0 %    Lymph% 9.3 (L) 18.0 - 48.0 %    Mono% 12.6 4.0 - 15.0 %    Eosinophil% 1.4 0.0 - 8.0 %    Basophil% 0.6 0.0 - 1.9 %    Platelet Estimate Appears normal     Aniso Slight     Poik Slight     Poly Occasional     Hypo Occasional     Ovalocytes Occasional     Boston Cells Occasional     Schistocytes Present     Differential Method Automated        No results for input(s): POCTGLUCOSE in the last 168 hours.       aspirin  81 mg Oral Daily    atorvastatin  80 mg Oral Daily    clopidogrel  75 mg Oral Daily    doxycycline  100 mg Oral Q12H    furosemide  60 mg Intravenous TID    heparin (porcine)  5,000 Units Subcutaneous Q8H    lisinopril  20 mg Oral Daily    omega-3 fatty acids-fish oil  1 capsule Oral Daily    pantoprazole  40 mg Oral Daily    sertraline  25 mg Oral Daily       Assessment and Plan     Active Hospital Problems    Diagnosis  POA    *Acute on chronic congestive heart failure [I50.9]  Yes    Paravalvular leak (prosthetic valve) [T82.03XA]  Yes    Non-rheumatic mitral regurgitation [I34.0]  Unknown    Nonrheumatic aortic valve stenosis [I35.0]  Unknown    Atherosclerosis of aorta [I70.0]  Unknown    Protein calorie malnutrition [E46]  Yes    Elevated LFTs [R79.89]  Yes    Acute on chronic respiratory failure with hypoxemia [J96.21]  Yes    Mild malnutrition [E44.1]  Yes    Pacemaker [Z95.0]  Yes     '03 for heart block      History of lung cancer [Z85.118]   Not Applicable     Pulm: Dr Berrios at [facility]      S/P AVR (aortic valve replacement) [Z95.2]  Not Applicable     Bovine '03      Weight loss [R63.4]  Yes    CAD (coronary artery disease) [I25.10]  Yes    Essential hypertension [I10]  Yes    Anemia [D64.9]  Yes    Erythema of breast [L53.9]  Yes    Hyperlipemia [E78.5]  Yes      Resolved Hospital Problems    Diagnosis Date Resolved POA   No resolved problems to display.       Problems Addressed today:    Acute on chronic systolic CHF  Severe AS  Paravalvular AV leak  Chronic hypoxemic respiratory failure on home O2 2L  - no prior records on file - previously received care at   - Lasix 60 IV TID --> transition to PO today and monitor  - outpt PVL repair with Dr Panda Cobos next Weds  - Continue oxygen 2L/NC and wean as tolerated to maintain saturations greater than 92%  - daily wt  - strict I/O's   - 1.5L fluid restriction while inpt   - telemetry  - keep Mg >2, K >4    L breast erythema - DDx cellulitis, other. ?peau d'orange. Negative fever and tenderness. 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 Unclear etiology - had EGD and C-scope at [facility] with no source found; VCE not done. No BPR, no other blood loss. Recent transfusion of 2u pRBCs at [facility] 1/25. Fe studies unremarkable (ferritin wnl, TIBC wnl) except Fe sat low at 14%  - Valve repair ASAP as above, expect improvement thereafter  - defer to PCP for further workup    Essential HTN  - home Lisinopril 20  - NO BB for now (not on BB at home and may need to give positive inotropic support)    CAD - ?details  - troponin elevated 0.413, denies CP, EKG without obvious ischemic changes, likely related to worsening cardiac function, trend Q 6 x3    Transaminitis - likely congestive hepatopathy  - improving with  diuresis  - daily CMP    Abnormal thyroid labs - TSH slightly elevated but FT4 wnl  - re-check TFTs when out of acute setting    Elevated A1C - 6.3 - prediabetes  -  on diet and lifestyle modifications    Weight loss - 30 pounds in 3-6 months. Prior weight 157 --> 128 (dry weight)    HypoMg - replaced IV this morning; she complains of new leg cramps thsi8 evening so will give additional Mg IV 2g    HypoK - replace PO    Mild malnutrition - Alb 3.0. Prealbumin wnl.  - Boost plus vanilla per nutrition consult  - MVI    Deconditioning   - high risk of falls; utilize all safety measures and fall precautions   - PT/OT    PPX: SCDs; hep 5K q8    Goals of Care/Advanced Directives: full code    Disposition/Post-Acute Care: pending clinical condition, likely home with no services    Discussed plan with her and  at bedside    Time spent in care of the patient (Greater than 1/2 spent in direct face-to-face contact) 35 minutes    Gisela Cobos MD

## 2018-02-16 NOTE — NURSING
Resume care from previous nurse, pt voice no complaints, lying in bed w/ bed in lowest position and locked. Call bell within reach, introduce myself to pt. Will continue to monitor pt status- ipad at bedside, visis connected to pt

## 2018-02-16 NOTE — PLAN OF CARE
met with pt and pts spouse at the bedside.  Pt is an alert lady who lives at home with her  and plans to return.   discussed hh benefits with pt and spouse and they feel hh is not indicated at this time.  Pt has good support from her  and daughters.

## 2018-02-17 VITALS
OXYGEN SATURATION: 92 % | TEMPERATURE: 98 F | HEIGHT: 64 IN | SYSTOLIC BLOOD PRESSURE: 131 MMHG | HEART RATE: 62 BPM | BODY MASS INDEX: 21.91 KG/M2 | WEIGHT: 128.31 LBS | RESPIRATION RATE: 19 BRPM | DIASTOLIC BLOOD PRESSURE: 57 MMHG

## 2018-02-17 LAB
ALBUMIN SERPL BCP-MCNC: 2.5 G/DL
ALP SERPL-CCNC: 60 U/L
ALT SERPL W/O P-5'-P-CCNC: 69 U/L
ANION GAP SERPL CALC-SCNC: 9 MMOL/L
ANISOCYTOSIS BLD QL SMEAR: SLIGHT
AST SERPL-CCNC: 41 U/L
BASOPHILS # BLD AUTO: 0.03 K/UL
BASOPHILS NFR BLD: 0.5 %
BILIRUB SERPL-MCNC: 1 MG/DL
BUN SERPL-MCNC: 40 MG/DL
BURR CELLS BLD QL SMEAR: ABNORMAL
CALCIUM SERPL-MCNC: 9.1 MG/DL
CHLORIDE SERPL-SCNC: 104 MMOL/L
CO2 SERPL-SCNC: 27 MMOL/L
CREAT SERPL-MCNC: 1.3 MG/DL
DIFFERENTIAL METHOD: ABNORMAL
EOSINOPHIL # BLD AUTO: 0.1 K/UL
EOSINOPHIL NFR BLD: 1.2 %
ERYTHROCYTE [DISTWIDTH] IN BLOOD BY AUTOMATED COUNT: 24.7 %
EST. GFR  (AFRICAN AMERICAN): 44.5 ML/MIN/1.73 M^2
EST. GFR  (NON AFRICAN AMERICAN): 38.6 ML/MIN/1.73 M^2
GLUCOSE SERPL-MCNC: 127 MG/DL
HCT VFR BLD AUTO: 27 %
HGB BLD-MCNC: 8.3 G/DL
HYPOCHROMIA BLD QL SMEAR: ABNORMAL
IMM GRANULOCYTES # BLD AUTO: 0.03 K/UL
IMM GRANULOCYTES NFR BLD AUTO: 0.5 %
LYMPHOCYTES # BLD AUTO: 0.5 K/UL
LYMPHOCYTES NFR BLD: 7.7 %
MAGNESIUM SERPL-MCNC: 1.7 MG/DL
MCH RBC QN AUTO: 28.3 PG
MCHC RBC AUTO-ENTMCNC: 30.7 G/DL
MCV RBC AUTO: 92 FL
MONOCYTES # BLD AUTO: 0.7 K/UL
MONOCYTES NFR BLD: 10.7 %
NEUTROPHILS # BLD AUTO: 5.3 K/UL
NEUTROPHILS NFR BLD: 79.4 %
NRBC BLD-RTO: 0 /100 WBC
OVALOCYTES BLD QL SMEAR: ABNORMAL
PHOSPHATE SERPL-MCNC: 2.9 MG/DL
PLATELET # BLD AUTO: 175 K/UL
PLATELET BLD QL SMEAR: ABNORMAL
PMV BLD AUTO: 12.1 FL
POIKILOCYTOSIS BLD QL SMEAR: SLIGHT
POLYCHROMASIA BLD QL SMEAR: ABNORMAL
POTASSIUM SERPL-SCNC: 3.7 MMOL/L
PROT SERPL-MCNC: 5.3 G/DL
RBC # BLD AUTO: 2.93 M/UL
SCHISTOCYTES BLD QL SMEAR: PRESENT
SODIUM SERPL-SCNC: 140 MMOL/L
WBC # BLD AUTO: 6.63 K/UL

## 2018-02-17 PROCEDURE — 99239 HOSP IP/OBS DSCHRG MGMT >30: CPT | Mod: ,,, | Performed by: HOSPITALIST

## 2018-02-17 PROCEDURE — 36415 COLL VENOUS BLD VENIPUNCTURE: CPT

## 2018-02-17 PROCEDURE — 85025 COMPLETE CBC W/AUTO DIFF WBC: CPT

## 2018-02-17 PROCEDURE — 63600175 PHARM REV CODE 636 W HCPCS: Performed by: HOSPITALIST

## 2018-02-17 PROCEDURE — 84100 ASSAY OF PHOSPHORUS: CPT

## 2018-02-17 PROCEDURE — 83735 ASSAY OF MAGNESIUM: CPT

## 2018-02-17 PROCEDURE — 25000003 PHARM REV CODE 250: Performed by: NURSE PRACTITIONER

## 2018-02-17 PROCEDURE — 80053 COMPREHEN METABOLIC PANEL: CPT

## 2018-02-17 PROCEDURE — 25000003 PHARM REV CODE 250: Performed by: HOSPITALIST

## 2018-02-17 RX ORDER — TRAMADOL HYDROCHLORIDE 50 MG/1
50 TABLET ORAL EVERY 8 HOURS PRN
Qty: 20 TABLET | Refills: 0 | Status: SHIPPED | OUTPATIENT
Start: 2018-02-17 | End: 2018-02-17

## 2018-02-17 RX ORDER — TRAMADOL HYDROCHLORIDE 50 MG/1
50 TABLET ORAL EVERY 8 HOURS PRN
Qty: 20 TABLET | Refills: 0 | Status: SHIPPED | OUTPATIENT
Start: 2018-02-17 | End: 2018-02-27

## 2018-02-17 RX ORDER — FUROSEMIDE 40 MG/1
40 TABLET ORAL 2 TIMES DAILY
Qty: 60 TABLET | Refills: 0 | Status: SHIPPED | OUTPATIENT
Start: 2018-02-17

## 2018-02-17 RX ORDER — DOXYCYCLINE HYCLATE 100 MG
100 TABLET ORAL EVERY 12 HOURS
Qty: 14 TABLET | Refills: 0 | Status: SHIPPED | OUTPATIENT
Start: 2018-02-17 | End: 2018-02-24

## 2018-02-17 RX ADMIN — PANTOPRAZOLE SODIUM 40 MG: 40 TABLET, DELAYED RELEASE ORAL at 09:02

## 2018-02-17 RX ADMIN — DOXYCYCLINE HYCLATE 100 MG: 100 TABLET, COATED ORAL at 09:02

## 2018-02-17 RX ADMIN — ASPIRIN 81 MG: 81 TABLET, COATED ORAL at 09:02

## 2018-02-17 RX ADMIN — CLOPIDOGREL 75 MG: 75 TABLET, FILM COATED ORAL at 09:02

## 2018-02-17 RX ADMIN — HEPARIN SODIUM 5000 UNITS: 5000 INJECTION, SOLUTION INTRAVENOUS; SUBCUTANEOUS at 05:02

## 2018-02-17 RX ADMIN — THERA TABS 1 TABLET: TAB at 09:02

## 2018-02-17 RX ADMIN — FUROSEMIDE 40 MG: 40 TABLET ORAL at 09:02

## 2018-02-17 RX ADMIN — LISINOPRIL 20 MG: 20 TABLET ORAL at 09:02

## 2018-02-17 RX ADMIN — Medication 1 CAPSULE: at 09:02

## 2018-02-17 RX ADMIN — SERTRALINE HYDROCHLORIDE 25 MG: 25 TABLET ORAL at 09:02

## 2018-02-17 RX ADMIN — ATORVASTATIN CALCIUM 80 MG: 20 TABLET, FILM COATED ORAL at 09:02

## 2018-02-17 NOTE — PLAN OF CARE
[facility]    HOME HEALTH ORDERS  FACE TO FACE ENCOUNTER    Patient Name: Delmi Nunez  YOB: 1935    PCP: Primary Doctor No   PCP Address: None  PCP Phone Number: None  PCP Fax: None    Encounter Date: 02/17/2018    Admit to Home Health    Diagnoses:  Active Hospital Problems    Diagnosis  POA    *Acute on chronic congestive heart failure [I50.9]  Yes    Paravalvular leak of prosthetic heart valve [T82.03XA]  Yes    Non-rheumatic mitral regurgitation [I34.0]  Yes    Nonrheumatic aortic valve stenosis [I35.0]  Yes    Atherosclerosis of aorta [I70.0]  Yes    Protein calorie malnutrition [E46]  Yes    Elevated LFTs [R79.89]  Yes    Acute on chronic respiratory failure with hypoxemia [J96.21]  Yes    Mild malnutrition [E44.1]  Yes    Pacemaker [Z95.0]  Yes     '03 for heart block      History of lung cancer [Z85.118]  Not Applicable     Pulm: [facility] at [facility]      S/P AVR (aortic valve replacement) [Z95.2]  Not Applicable     Bovine '03      Weight loss [R63.4]  Yes    CAD (coronary artery disease) [I25.10]  Yes    Essential hypertension [I10]  Yes    Anemia [D64.9]  Yes    Erythema of breast [L53.9]  Yes    Hyperlipemia [E78.5]  Yes      Resolved Hospital Problems    Diagnosis Date Resolved POA   No resolved problems to display.       No future appointments.  Follow-up Information     Abdulkadir Cobos MD On 2/21/2018.    Specialties:  Cardiology, INTERVENTIONAL CARDIOLOGY  Why:  Paravalvular leak closure  Contact information:  [address] [place] [id number] [phone]             Primary Care Physician.           Mammogram ASAP.                   I have seen and examined this patient face to face today. My clinical findings that support the need for the home health skilled services and home bound status are the following:  Weakness/numbness causing balance and gait disturbance due to Heart Failure making it taxing to leave  home.    Allergies:  Review of patient's allergies indicates:   Allergen Reactions    Codeine        Diet: cardiac diet    Activities: activity as tolerated    Nursing:   SN to complete comprehensive assessment including routine vital signs. Instruct on disease process and s/s of complications to report to MD. Review/verify medication list sent home with the patient at time of discharge  and instruct patient/caregiver as needed. Frequency may be adjusted depending on start of care date.    Notify MD if SBP > 160 or < 90; DBP > 90 or < 50; HR > 120 or < 50; Temp > 101      CONSULTS:    Physical Therapy to evaluate and treat. Evaluate for home safety and equipment needs; Establish/upgrade home exercise program. Perform / instruct on therapeutic exercises, gait training, transfer training, and Range of Motion.  Occupational Therapy to evaluate and treat. Evaluate home environment for safety and equipment needs. Perform/Instruct on transfers, ADL training, ROM, and therapeutic exercises.    MISCELLANEOUS CARE:  Heart Failure:      SN to instruct on the following:    Instruct on the definition of CHF.   Instruct on the signs/sympoms of CHF to be reported.   Instruct on and monitor daily weights.   Instruct on factors that cause exacerbation.   Instruct on action, dose, schedule, and side effects of medications.   Instruct on diet as prescribed.   Instruct on activity allowed.   Instruct on life-style modifications for life long management of CHF   SN to assess compliance with daily weights, diet, medications, fluid retention,    safety precautions, activities permitted and life-style modifications.   Additional 1-2 SN visits per week as needed for signs and symptoms     of CHF exacerbation.      For Weight Gain > 2-3 lbs in 1 day or 4-6 lbs over 1 week notify PCP:  Increase Lasix (oral diuretic) dose to 40mg PO TID for 5 days temporarily    WOUND CARE ORDERS  n/a      Medications: Review discharge medications with  patient and family and provide education.      Current Discharge Medication List      START taking these medications    Details   doxycycline (VIBRA-TABS) 100 MG tablet Take 1 tablet (100 mg total) by mouth every 12 (twelve) hours.  Qty: 14 tablet, Refills: 0      multivitamin (THERAGRAN) tablet Take 1 tablet by mouth once daily.         CONTINUE these medications which have CHANGED    Details   furosemide (LASIX) 40 MG tablet Take 1 tablet (40 mg total) by mouth 2 (two) times daily. If wt gain 2-3 lbs x 2-5 days, take TID. If no improvement, call MD  Qty: 60 tablet, Refills: 0         CONTINUE these medications which have NOT CHANGED    Details   aspirin (ECOTRIN) 81 MG EC tablet Take 81 mg by mouth once daily.      atorvastatin (LIPITOR) 80 MG tablet Take 80 mg by mouth once daily.      clopidogrel (PLAVIX) 75 mg tablet Take 75 mg by mouth once daily.      FOLIC ACID/MULTIVIT-MIN/LUTEIN (CENTRUM SILVER ORAL) Take by mouth.      lisinopril (PRINIVIL,ZESTRIL) 20 MG tablet Take 20 mg by mouth once daily.      omega 3-dha-epa-fish oil (FISH OIL) 100-160-1,000 mg Cap Take by mouth.      omeprazole (PRILOSEC) 20 MG capsule Take 20 mg by mouth once daily.      sertraline (ZOLOFT) 25 MG tablet Take 25 mg by mouth once daily.      meclizine (ANTIVERT) 32 MG tablet Take 32 mg by mouth 3 (three) times daily as needed.             I certify that this patient is confined to her home and needs intermittent skilled nursing care, physical therapy and occupational therapy.

## 2018-02-17 NOTE — PLAN OF CARE
Pt being discharged home with home health services.  Put in call to [facility], [phone].  Pt's family is providing transportation home.  Did receive call back from , spoke with Cami who stated to fax orders and paperwork to [phone].  Pt ready to discharge home.

## 2018-02-19 ENCOUNTER — PATIENT OUTREACH (OUTPATIENT)
Dept: ADMINISTRATIVE | Facility: CLINIC | Age: 83
End: 2018-02-19

## 2018-02-19 NOTE — PATIENT INSTRUCTIONS

## 2018-02-20 ENCOUNTER — ANESTHESIA EVENT (OUTPATIENT)
Dept: MEDSURG UNIT | Facility: HOSPITAL | Age: 83
End: 2018-02-20
Payer: MEDICARE

## 2018-02-20 NOTE — ANESTHESIA PREPROCEDURE EVALUATION
[facility]  Anesthesia Pre-Operative Evaluation         Patient Name: Delmi Nunez  YOB: 1935  MRN: [id number]    SUBJECTIVE:        02/20/2018    Delmi Nunez is a 83 y.o. female w/ a significant PMHx of CHF with pacemaker, chronic resp failure on home O2 2L, HTN, CAD, HLD, AAA, h/o lung cancer (adenocarcinoma, ?carcinoid) s/p wedge and partial lobectomy '15, and anemia who presented to ED with shortness of breath with activity and at rest and B LE edema.  Was found to have perivalvular leak of prosthetic aortic valve and now scheduled for closure.          Patient Active Problem List   Diagnosis    Acute on chronic congestive heart failure    CAD (coronary artery disease)    Essential hypertension    Hyperlipemia    Anemia    Erythema of breast    Protein calorie malnutrition    Elevated LFTs    Acute on chronic respiratory failure with hypoxemia    Mild malnutrition    Pacemaker    History of lung cancer    S/P AVR (aortic valve replacement)    Weight loss    Atherosclerosis of aorta    Paravalvular leak of prosthetic heart valve    Non-rheumatic mitral regurgitation    Nonrheumatic aortic valve stenosis       Review of patient's allergies indicates:   Allergen Reactions    Codeine        Current Inpatient Medications:      Current Outpatient Prescriptions on File Prior to Visit   Medication Sig Dispense Refill    aspirin (ECOTRIN) 81 MG EC tablet Take 81 mg by mouth once daily.      atorvastatin (LIPITOR) 80 MG tablet Take 80 mg by mouth once daily.      clopidogrel (PLAVIX) 75 mg tablet Take 75 mg by mouth once daily.      doxycycline (VIBRA-TABS) 100 MG tablet Take 1 tablet (100 mg total) by mouth every 12 (twelve) hours. 14 tablet 0    FOLIC ACID/MULTIVIT-MIN/LUTEIN (CENTRUM SILVER ORAL) Take by mouth.      furosemide (LASIX) 40 MG tablet Take 1 tablet (40 mg total) by mouth 2 (two) times daily. If wt gain 2-3 lbs x 2-5 days, take TID. If  no improvement, call MD 60 tablet 0    lisinopril (PRINIVIL,ZESTRIL) 20 MG tablet Take 20 mg by mouth once daily.      meclizine (ANTIVERT) 32 MG tablet Take 32 mg by mouth 3 (three) times daily as needed.      multivitamin (THERAGRAN) tablet Take 1 tablet by mouth once daily.      omega 3-dha-epa-fish oil (FISH OIL) 100-160-1,000 mg Cap Take by mouth.      omeprazole (PRILOSEC) 20 MG capsule Take 20 mg by mouth once daily.      sertraline (ZOLOFT) 25 MG tablet Take 25 mg by mouth once daily.      traMADol (ULTRAM) 50 mg tablet Take 1 tablet (50 mg total) by mouth every 8 (eight) hours as needed for Pain. 20 tablet 0     No current facility-administered medications on file prior to visit.        Past Surgical History:   Procedure Laterality Date    CARDIAC PACEMAKER PLACEMENT      CORONARY ARTERY BYPASS GRAFT      EYE SURGERY      LUNG REMOVAL, PARTIAL Left        Social History     Social History    Marital status:      Spouse name: N/A    Number of children: N/A    Years of education: N/A     Occupational History    Not on file.     Social History Main Topics    Smoking status: Former Smoker    Smokeless tobacco: Never Used    Alcohol use No    Drug use: No    Sexual activity: Not on file     Other Topics Concern    Not on file     Social History Narrative    No narrative on file       OBJECTIVE:     Vital Signs Range (Last 24H):  BP: ()/()   Arterial Line BP: ()/()       CBC:   No results for input(s): WBC, RBC, HGB, HCT, PLT, MCV, MCH, MCHC in the last 72 hours.    CMP:   No results for input(s): NA, K, CL, CO2, BUN, CREATININE, GLU, MG, PHOS, CALCIUM, ALBUMIN, PROT, ALKPHOS, ALT, AST, BILITOT in the last 72 hours.    INR:  No results for input(s): PT, INR, PROTIME, APTT in the last 72 hours.    Diagnostic Studies: No relevant studies.    EK18  Ventricular-paced rhythm  Abnormal ECG  When compared with ECG of 2010 06:08,  Electronic ventricular pacemaker has replaced  Sinus rhythm    2D ECHO:  Results for orders placed or performed during the hospital encounter of 02/12/18   2D echo with color flow doppler   Result Value Ref Range    EF 60 55 - 65    Mitral Valve Regurgitation MODERATE TO SEVERE (A)     Aortic Valve Regurgitation MILD     Aortic Valve Stenosis MODERATE TO SEVERE (A)     Est. PA Systolic Pressure 48.64 (A)     Tricuspid Valve Regurgitation MILD          ASSESSMENT/PLAN:         Pre-op Assessment    I have reviewed the Patient Summary Reports.     I have reviewed the Nursing Notes.   I have reviewed the Medications.     Review of Systems  Anesthesia Hx:  No problems with previous Anesthesia  History of prior surgery of interest to airway management or planning: lung surgery. Denies Family Hx of Anesthesia complications.   Denies Personal Hx of Anesthesia complications.   Hematology/Oncology:         -- Anemia: --  Cancer in past history (lung):    EENT/Dental:EENT/Dental Normal   Cardiovascular:   Exercise tolerance: poor Pacemaker Hypertension CAD   CHF    Pulmonary:   Shortness of breath On home O2-2L   Renal/:   Chronic Renal Disease, CRI    Hepatic/GI:  Hepatic/GI Normal    Neurological:  Neurology Normal  Denies CVA. Denies Seizures.    Endocrine:  Endocrine Normal        Physical Exam  General:  Well nourished    Airway/Jaw/Neck:  Airway Findings: Mouth Opening: Normal General Airway Assessment: Adult  Mallampati: II  TM Distance: 4 - 6 cm  Jaw/Neck Findings:  Neck ROM: Extension Decreased, Mild     Eyes/Ears/Nose:  EYES/EARS/NOSE FINDINGS: Normal   Dental:  Dental Findings: In tact   Chest/Lungs:  Chest/Lungs Findings: Normal Respiratory Rate, Rales, Basilar     Heart/Vascular:  Heart Findings: Rate: Normal  Rhythm: Regular Rhythm  Heart Murmur  Systolic        Mental Status:  Mental Status Findings:  Cooperative         Anesthesia Plan  Type of Anesthesia, risks & benefits discussed:  Anesthesia Type:  general, MAC  Patient's Preference:   Intra-op  Monitoring Plan: standard ASA monitors  Intra-op Monitoring Plan Comments:   Post Op Pain Control Plan: multimodal analgesia, IV/PO Opioids PRN and per primary service following discharge from PACU  Post Op Pain Control Plan Comments:   Induction:   IV  Beta Blocker:  Patient is not currently on a Beta-Blocker (No further documentation required).       Informed Consent: Patient understands risks and agrees with Anesthesia plan.  Questions answered. Anesthesia consent signed with patient.  ASA Score: 4     Day of Surgery Review of History & Physical:  There are no significant changes.  H&P update referred to the provider.         Ready For Surgery From Anesthesia Perspective.

## 2018-02-21 ENCOUNTER — ANESTHESIA (OUTPATIENT)
Dept: MEDSURG UNIT | Facility: HOSPITAL | Age: 83
End: 2018-02-21
Payer: MEDICARE

## 2018-02-28 ENCOUNTER — EDUCATION (OUTPATIENT)
Dept: CARDIOLOGY | Facility: CLINIC | Age: 83
End: 2018-02-28

## 2018-02-28 NOTE — PROGRESS NOTES
OUTPATIENT CATHETERIZATION INSTRUCTIONS    You have been scheduled for a procedure in the catheterization lab on Tuesday, March 6, 2018.     Please report to the Cardiology Waiting Area on the Third floor of the hospital and check in at 8 AM.   You will then be taken to the SSCU (Short Stay Cardiac Unit) and prepared for your procedure. Please be aware that this is not the time of your procedure but the time you are to arrive. The procedures are scheduled on an hourly basis; however, emergency cases take precedence over all other cases.       You may not have anything to eat or drink after midnight the night before your test. You may take your regular morning medications with water. If there are any medications that you should not take you will be instructed to hold them that morning. If you are diabetic and on Metformin (Glucophage) do not take it the day before, the day of, and for 2 days after your procedure.      The procedure will take 1-2 hours to perform. After the procedure, you will return to SSCU on the third floor of the hospital. You will need to lie still (or keep your arm still) for the next 4 to 6 hours to minimize bleeding from the puncture site. Your family may remain in the room with you during this time.       You may be able to be discharged home that same afternoon if there is someone to drive you home and there were no complications. If you have one of the balloon, stent, or device procedures you may spend the night in the hospital. Your doctor will determine, based on your progress, the date and time of your discharge. The results of your procedure will be discussed with you before you are discharged. Any further testing or procedures will be scheduled for you either before you leave or you will be called with these appointments.       If you should have any questions, concerns, or need to change the date of your procedure, please call  HANG Wahl @ [phone]    Special  Instructions:  Continue on meds        THE ABOVE INSTRUCTIONS WERE GIVEN TO THE PATIENT VERBALLY AND THEY VERBALIZED UNDERSTANDING.  THEY DO NOT REQUIRE ANY SPECIAL NEEDS AND DO NOT HAVE ANY LEARNING BARRIERS.          Directions for Reporting to Cardiology Waiting Area in the Hospital  If you park in the Parking Garage:  Take elevators to the1st floor of the parking garage.  Continue past the gift shop, coffee shop, and piano.  Take a right and go to the gold elevators. (Elevator B)  Take the elevator to the 3rd floor.  Follow the arrow on the sign on the wall that says Cath Lab Registration/EP Lab Registration.  Follow the long hallway all the way around until you come to a big open area.  This is the registration area.  Check in at Reception Desk.    OR    If family is dropping you off:  Have them drop you off at the front of the Hospital under the green overhang.  Enter through the doors and take a right.  Take the E elevators to the 3rd floor Cardiology Waiting Area.  Check in at the Reception Desk in the waiting room.

## 2018-03-05 NOTE — DISCHARGE SUMMARY
Discharge Summary  Hospital Medicine    Attending Provider on Discharge: Gisela Cobos MD  Hospital Medicine Team: [facility] MED C  Date of Admission:  2/12/2018     Date of Discharge:  2/17/2018    Active Hospital Problems    Diagnosis  POA    *Acute on chronic congestive heart failure [I50.9]  Yes    Paravalvular leak of prosthetic heart valve [T82.03XA]  Yes    Non-rheumatic mitral regurgitation [I34.0]  Yes    Nonrheumatic aortic valve stenosis [I35.0]  Yes    Atherosclerosis of aorta [I70.0]  Yes    Protein calorie malnutrition [E46]  Yes    Elevated LFTs [R79.89]  Yes    Acute on chronic respiratory failure with hypoxemia [J96.21]  Yes    Mild malnutrition [E44.1]  Yes    Pacemaker [Z95.0]  Yes     '03 for heart block      History of lung cancer [Z85.118]  Not Applicable     [facility]ulm: [facility] Ber[facility]      S/P AVR (aortic valve replacement) [Z95.2]  Not Applicable     [place]ovine '03      Weight loss [R63.4]  Yes    CAD (coronary artery disease) [I25.10]  Yes    Essential hypertension [I10]  Yes    Anemia [D64.9]  Yes    Erythema of breast [L53.9]  Yes    Hyperlipemia [E78.5]  Yes      Resolved Hospital Problems    Diagnosis Date Resolved POA   No resolved problems to display.       History of the Present Illness:      Mrs. Nunez is a pleasant 84yo  female with history of CHF (unknown details), chronic resp failure on home O2 2L x 2 weeks, HTN, CAD (?details), HTN, HLD, AAA (?details), h/o lung cancer (adenocarcinoma, ?carcinoid) s/p wedge and partial lobectomy '15, and anemia who presented to ED with shortness of breath with activity and at rest and B LE edema.  She was recently admitted to [facility] for same complaints and was discharged on 1/29/18 with home oxygen 2L/NC.  She states her Lasix has recently been increased from 20mg once daily to 40mg once daily within last week. She denies CP, cough, fever, chills, headache, and sputum production. She also reports redness of  "left breast and bruising of her left foot x 1 day.  She states she noticed these this morning when she woke up.  She denies numbness, pain, or trauma to either area; denies discharge from left breast    Hospital Course of Principle Problem Addressed:       Admitted to [facility], started on Lasix 40 IV BID. net -780 cc x 12 hours, continues with AVILES. The next day, net -625cc, weight unchanged, increased Lasix to 60 IV TID. Valve team consulted. 2D Echo done, with results as below.    Improved diuresis, net -2000.  Weight decreased to 128 from 132. C/O B leg cramps. IVAN done and revealed "severe paravalvular aortic regurgitation. There is a bioprosthetic aortic valve with large, anterior PVL ( between LCC and RCC and under RCC).  The regurgitant jet splits into two jets diagnoally across the LVOT."  Valve team plans for outpatient PVL closure in the near future, then TAVR workup.  Continue diuresis, have a lot of fluid still to diurese, aim for d/c home Monday    The day before discharge, she looked and felt great, and she was approaching euvolemia so transitioned to oral lasix with possible d/c home tomorrow if no re-accumulation of fluid. CTS consult per Valve/Interventinal recs. Scheduled for PVL closure next Weds.       Other Medical Problems Addressed in the Hospital:     Acute on chronic systolic CHF  Severe AS  Paravalvular AV leak  Chronic hypoxemic respiratory failure on home O2 2L  - no prior records on file - previously received care at   - Lasix 60 IV TID --> transitioned to PO the day before d/c home and monitored; she did well with net -800cc over the next 24 hours, thus she did not reaccumulate fluid and was ok for d/c home with Lasix sliding scale  - outpt PVL repair with Dr Panda Cobos next Weds  - Continue oxygen 2L/NC and wean as tolerated to maintain saturations greater than 92%  - daily wt  - strict I/O's   - 1.5L fluid restriction while inpt   - telemetry  - keep Mg >2, K >4     L breast erythema - " DDx cellulitis, other. Does not appear classic for peau d'orange. Negative fever and tenderness. 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 Unclear etiology - had EGD and C-scope at [facility] with no source found; VCE not done. No BPR, no other blood loss. Recent transfusion of 2u pRBCs at [facility] 1/25. Fe studies unremarkable (ferritin wnl, TIBC wnl) except Fe sat low at 14%  - Valve repair ASAP as above, expect improvement thereafter  - defer to PCP for further workup     Essential HTN  - home Lisinopril 20  - NO BB for now (not on BB at home and may need to give positive inotropic support)     CAD - ?details  - troponin elevated 0.413, denies CP, EKG without obvious ischemic changes, likely related to worsening cardiac function, trend Q 6 x3     Transaminitis - likely congestive hepatopathy  - improved with diuresis  - monitored daily CMP     Abnormal thyroid labs - TSH slightly elevated but FT4 wnl  - re-check TFTs when out of acute setting     Elevated A1C - 6.3 - prediabetes  - counseled on diet and lifestyle modifications     Weight loss - 30 pounds in 3-6 months. Prior weight 157 --> 128 (dry weight)     HypoMg, symptomatic with leg cramps - replaced IV      HypoK - replace PO     Mild malnutrition - Alb 3.0. Prealbumin wnl.  - Boost plus vanilla per nutrition consult  - MVI     Deconditioning   - high risk of falls; utilize all safety measures and fall precautions   - PT/OT     PPX: SCDs; hep 5K q8    Significant diagnostic tests     No results for input(s): WBC, HGB, HCT, PLT in the last 168 hours.  No results for input(s): NA, K, CL, CO2, BUN, CREATININE, CALCIUM, MG, PHOS, PROT, BILITOT, ALKPHOS, ALT, AST, GLUCOSE in the last 168 hours.    Invalid input(s): LABALBU    Other diagnostic tests:  2/14 2D Echo with CFD CONCLUSIONS     1 - Upper  limit of normal left ventricular enlargement.     2 - Normal left ventricular systolic function (EF 60-65%).     3 - Eccentric hypertrophy.     4 - Wall motion abnormalities.     5 - Right ventricle is upper limit of normal in size with not well seen systolic function.     6 - Biatrial enlargement.     7 - Moderate to severe aortic stenosis, HILARY = 0.92 cm2, AVAi = 0.56 cm2/m2, peak velocity = 4.21 m/s, mean gradient = 46 mmHg.     8 - Mild aortic regurgitation.     9 - Moderate to severe mitral regurgitation.     10 - Mild tricuspid regurgitation.     11 - Increased central venous pressure.     12 - Pulmonary hypertension. The estimated PA systolic pressure is 49 mmHg.    2/15 IVAN  CONCLUSIONS     1 - Left atrial appendage is bilobed with no visualized thrombus.     2 - Biatrial enlargement.     3 - Normal left ventricular systolic function (EF 60-65%).     4 - Severe aortic regurgitation.     5 - The mitral valve is mildly sclerotic with evidence of tethered posterior leaflet.     6 - Severe mitral regurgitation.     7 - Aortic valve prosthesis.     8 - Severe paravalvular aortic regurgitation ( see text).     9 - Mild tricuspid regurgitation.     10 - Grade 2 atheroma disease of aorta.     Special Treatments/ Procedures:     none    Discharge Medications:        Discharge Medication List as of 2/17/2018  9:32 AM      START taking these medications    Details   doxycycline (VIBRA-TABS) 100 MG tablet Take 1 tablet (100 mg total) by mouth every 12 (twelve) hours., Starting Sat 2/17/2018, Until Sat 2/24/2018, Normal      multivitamin (THERAGRAN) tablet Take 1 tablet by mouth once daily., Starting Sun 2/18/2018, OTC         CONTINUE these medications which have CHANGED    Details   furosemide (LASIX) 40 MG tablet Take 1 tablet (40 mg total) by mouth 2 (two) times daily. If wt gain 2-3 lbs x 2-5 days, take TID. If no improvement, call MD, Starting Sat 2/17/2018, Normal         CONTINUE these medications which have NOT  CHANGED    Details   aspirin (ECOTRIN) 81 MG EC tablet Take 81 mg by mouth once daily., Historical Med      atorvastatin (LIPITOR) 80 MG tablet Take 80 mg by mouth once daily., Historical Med      clopidogrel (PLAVIX) 75 mg tablet Take 75 mg by mouth once daily., Historical Med      FOLIC ACID/MULTIVIT-MIN/LUTEIN (CENTRUM SILVER ORAL) Take by mouth., Historical Med      lisinopril (PRINIVIL,ZESTRIL) 20 MG tablet Take 20 mg by mouth once daily., Historical Med      omega 3-dha-epa-fish oil (FISH OIL) 100-160-1,000 mg Cap Take by mouth., Historical Med      omeprazole (PRILOSEC) 20 MG capsule Take 20 mg by mouth once daily., Historical Med      sertraline (ZOLOFT) 25 MG tablet Take 25 mg by mouth once daily., Historical Med      meclizine (ANTIVERT) 32 MG tablet Take 32 mg by mouth 3 (three) times daily as needed., Historical Med             Discharge Diet:cardiac diet with Normal Fluid intake of 1500 - 2000 mL per day    Activity: activity as tolerated    Discharge Condition: Stable    Disposition: Home or Self Care    Tests pending at the time of discharge: none      Time spent  on the discharge of the patient including review of hospital course with the patient. reviewing discharge medications and arranging follow-up care 40 mins    Discharge examination Patient was seen and examined on the date of discharge and determined to be suitable for discharge.  Vital Signs Range (Last 24H):  Temp:  [97.9 °F (36.6 °C)-98.4 °F (36.9 °C)]   Pulse:  [59-73]   Resp:  [19-29]   BP: (109-158)/(53-75)   SpO2:  [92 %-99 %]      I & O (Last 24H):     Intake/Output Summary (Last 24 hours) at 02/17/18 0924  Last data filed at 02/17/18 0600    Gross per 24 hour   Intake              600 ml   Output             1400 ml   Net             -800 ml         Physical Exam:  General appearance: no distress, pale, elderly, well-groomed  Mental status: Alert and oriented x 3, pleasant  Neck: supple, thyroid not enlarged, no JVD  Pulm:   normal  respiratory effort, CTA B, no c/w/r  Card: RRR, S1, S2 normal, +systolic murmur, no click, rub or gallop  Ext: 1+ edema to B mid-shin - improved  Skin: color, texture, turgor normal. L breast +erythema 10cm around nipple (similar to prior), but edema improved from prior      Future Appointments  Date Time Provider Department Center   3/6/2018 8:15 AM 3PREP, ALECIA BOLAND [facility] SSCU [facility]   3/6/2018 10:00 AM INPATIENT, IVAN [facility] Alecia Cobos MD

## 2018-03-06 ENCOUNTER — OFFICE VISIT (OUTPATIENT)
Dept: MEDSURG UNIT | Facility: HOSPITAL | Age: 83
End: 2018-03-06
Attending: INTERNAL MEDICINE
Payer: MEDICARE

## 2018-03-06 ENCOUNTER — HOSPITAL ENCOUNTER (OUTPATIENT)
Facility: HOSPITAL | Age: 83
Discharge: HOME | End: 2018-03-06
Attending: INTERNAL MEDICINE | Admitting: INTERNAL MEDICINE
Payer: MEDICARE

## 2018-03-06 ENCOUNTER — SURGERY (OUTPATIENT)
Age: 83
End: 2018-03-06

## 2018-03-06 VITALS
TEMPERATURE: 97 F | SYSTOLIC BLOOD PRESSURE: 126 MMHG | HEART RATE: 60 BPM | RESPIRATION RATE: 16 BRPM | DIASTOLIC BLOOD PRESSURE: 56 MMHG | BODY MASS INDEX: 21.68 KG/M2 | HEIGHT: 64 IN | OXYGEN SATURATION: 90 % | WEIGHT: 127 LBS

## 2018-03-06 DIAGNOSIS — I35.0 NODULAR CALCIFIC AORTIC VALVE STENOSIS: ICD-10-CM

## 2018-03-06 DIAGNOSIS — N63.20 LEFT BREAST MASS: ICD-10-CM

## 2018-03-06 DIAGNOSIS — N63.0 BREAST MASS: ICD-10-CM

## 2018-03-06 DIAGNOSIS — R23.4 BREAST SKIN CHANGES: Primary | ICD-10-CM

## 2018-03-06 DIAGNOSIS — I50.33 ACUTE ON CHRONIC DIASTOLIC CONGESTIVE HEART FAILURE: Primary | ICD-10-CM

## 2018-03-06 DIAGNOSIS — T82.03XD PARAVALVULAR LEAK OF PROSTHETIC HEART VALVE, SUBSEQUENT ENCOUNTER: ICD-10-CM

## 2018-03-06 DIAGNOSIS — I50.9 HEART FAILURE: ICD-10-CM

## 2018-03-06 DIAGNOSIS — N64.9 DISORDER OF BREAST: ICD-10-CM

## 2018-03-06 PROBLEM — T82.03XA PARAVALVULAR LEAK (PROSTHETIC VALVE): Status: ACTIVE | Noted: 2018-03-06

## 2018-03-06 LAB
ABO + RH BLD: NORMAL
ANION GAP SERPL CALC-SCNC: 11 MMOL/L
AORTIC ATHEROMA: YES
AORTIC VALVE REGURGITATION: ABNORMAL
AORTIC VALVE STENOSIS: ABNORMAL
APTT BLDCRRT: 22.4 SEC
BASOPHILS # BLD AUTO: 0.04 K/UL
BASOPHILS NFR BLD: 0.5 %
BLD GP AB SCN CELLS X3 SERPL QL: NORMAL
BUN SERPL-MCNC: 52 MG/DL
CALCIUM SERPL-MCNC: 10 MG/DL
CHLORIDE SERPL-SCNC: 106 MMOL/L
CO2 SERPL-SCNC: 27 MMOL/L
CREAT SERPL-MCNC: 1.3 MG/DL
DIFFERENTIAL METHOD: ABNORMAL
EOSINOPHIL # BLD AUTO: 0 K/UL
EOSINOPHIL NFR BLD: 0.4 %
ERYTHROCYTE [DISTWIDTH] IN BLOOD BY AUTOMATED COUNT: 24.4 %
EST. GFR  (AFRICAN AMERICAN): 44.5 ML/MIN/1.73 M^2
EST. GFR  (NON AFRICAN AMERICAN): 38.6 ML/MIN/1.73 M^2
GLUCOSE SERPL-MCNC: 152 MG/DL
HCT VFR BLD AUTO: 30.6 %
HGB BLD-MCNC: 9.5 G/DL
IMM GRANULOCYTES # BLD AUTO: 0.03 K/UL
IMM GRANULOCYTES NFR BLD AUTO: 0.4 %
INR PPP: 1.2
LYMPHOCYTES # BLD AUTO: 0.4 K/UL
LYMPHOCYTES NFR BLD: 4.9 %
MCH RBC QN AUTO: 29.7 PG
MCHC RBC AUTO-ENTMCNC: 31 G/DL
MCV RBC AUTO: 96 FL
MITRAL VALVE MOBILITY: ABNORMAL
MITRAL VALVE REGURGITATION: ABNORMAL
MONOCYTES # BLD AUTO: 0.5 K/UL
MONOCYTES NFR BLD: 6.6 %
NEUTROPHILS # BLD AUTO: 6.6 K/UL
NEUTROPHILS NFR BLD: 87.2 %
NRBC BLD-RTO: 0 /100 WBC
PLATELET # BLD AUTO: 227 K/UL
PLATELET BLD QL SMEAR: ABNORMAL
PMV BLD AUTO: 11.4 FL
POTASSIUM SERPL-SCNC: 3.5 MMOL/L
PROTHROMBIN TIME: 12.3 SEC
RBC # BLD AUTO: 3.2 M/UL
SODIUM SERPL-SCNC: 144 MMOL/L
TRICUSPID VALVE REGURGITATION: ABNORMAL
WBC # BLD AUTO: 7.61 K/UL

## 2018-03-06 PROCEDURE — 85730 THROMBOPLASTIN TIME PARTIAL: CPT

## 2018-03-06 PROCEDURE — 63600175 PHARM REV CODE 636 W HCPCS: Performed by: ANESTHESIOLOGY

## 2018-03-06 PROCEDURE — 25000003 PHARM REV CODE 250

## 2018-03-06 PROCEDURE — 93005 ELECTROCARDIOGRAM TRACING: CPT | Mod: 59

## 2018-03-06 PROCEDURE — 37000009 HC ANESTHESIA EA ADD 15 MINS: Performed by: INTERNAL MEDICINE

## 2018-03-06 PROCEDURE — 25000003 PHARM REV CODE 250: Performed by: ANESTHESIOLOGY

## 2018-03-06 PROCEDURE — 86901 BLOOD TYPING SEROLOGIC RH(D): CPT

## 2018-03-06 PROCEDURE — 99499 UNLISTED E&M SERVICE: CPT | Mod: ,,, | Performed by: INTERNAL MEDICINE

## 2018-03-06 PROCEDURE — 85610 PROTHROMBIN TIME: CPT

## 2018-03-06 PROCEDURE — C1769 GUIDE WIRE: HCPCS

## 2018-03-06 PROCEDURE — 25000003 PHARM REV CODE 250: Performed by: PHYSICIAN ASSISTANT

## 2018-03-06 PROCEDURE — 37000008 HC ANESTHESIA 1ST 15 MINUTES: Performed by: INTERNAL MEDICINE

## 2018-03-06 PROCEDURE — 80048 BASIC METABOLIC PNL TOTAL CA: CPT

## 2018-03-06 PROCEDURE — 27000191 HC C-V MONITORING

## 2018-03-06 PROCEDURE — 93355 ECHO TRANSESOPHAGEAL (TEE): CPT | Mod: ,,, | Performed by: INTERNAL MEDICINE

## 2018-03-06 PROCEDURE — 63600175 PHARM REV CODE 636 W HCPCS

## 2018-03-06 PROCEDURE — 85025 COMPLETE CBC W/AUTO DIFF WBC: CPT

## 2018-03-06 PROCEDURE — D9220A PRA ANESTHESIA: Mod: ,,, | Performed by: ANESTHESIOLOGY

## 2018-03-06 PROCEDURE — A4216 STERILE WATER/SALINE, 10 ML: HCPCS | Performed by: ANESTHESIOLOGY

## 2018-03-06 PROCEDURE — S5010 5% DEXTROSE AND 0.45% SALINE: HCPCS | Performed by: PHYSICIAN ASSISTANT

## 2018-03-06 PROCEDURE — 93591 PERQ TRANSCATH CLS AORTIC: CPT | Mod: ,,, | Performed by: INTERNAL MEDICINE

## 2018-03-06 PROCEDURE — 93355 ECHO TRANSESOPHAGEAL (TEE): CPT

## 2018-03-06 PROCEDURE — 93010 ELECTROCARDIOGRAM REPORT: CPT | Mod: 59,,, | Performed by: INTERNAL MEDICINE

## 2018-03-06 RX ORDER — FENTANYL CITRATE 50 UG/ML
INJECTION, SOLUTION INTRAMUSCULAR; INTRAVENOUS
Status: DISCONTINUED | OUTPATIENT
Start: 2018-03-06 | End: 2018-03-06

## 2018-03-06 RX ORDER — ACETAMINOPHEN 325 MG/1
650 TABLET ORAL EVERY 4 HOURS PRN
Status: DISCONTINUED | OUTPATIENT
Start: 2018-03-06 | End: 2018-03-06 | Stop reason: HOSPADM

## 2018-03-06 RX ORDER — SODIUM CHLORIDE 9 MG/ML
INJECTION, SOLUTION INTRAVENOUS CONTINUOUS PRN
Status: DISCONTINUED | OUTPATIENT
Start: 2018-03-06 | End: 2018-03-06

## 2018-03-06 RX ORDER — HEPARIN SODIUM 1000 [USP'U]/ML
INJECTION, SOLUTION INTRAVENOUS; SUBCUTANEOUS
Status: DISCONTINUED | OUTPATIENT
Start: 2018-03-06 | End: 2018-03-06

## 2018-03-06 RX ORDER — DEXMEDETOMIDINE HYDROCHLORIDE 100 UG/ML
INJECTION, SOLUTION INTRAVENOUS
Status: DISCONTINUED | OUTPATIENT
Start: 2018-03-06 | End: 2018-03-06

## 2018-03-06 RX ORDER — HEPARIN 100 UNIT/ML
100 SYRINGE INTRAVENOUS EVERY 8 HOURS PRN
Status: DISCONTINUED | OUTPATIENT
Start: 2018-03-06 | End: 2018-03-06 | Stop reason: HOSPADM

## 2018-03-06 RX ORDER — DEXTROSE MONOHYDRATE AND SODIUM CHLORIDE 5; .45 G/100ML; G/100ML
INJECTION, SOLUTION INTRAVENOUS CONTINUOUS
Status: DISCONTINUED | OUTPATIENT
Start: 2018-03-06 | End: 2018-03-06 | Stop reason: HOSPADM

## 2018-03-06 RX ORDER — ONDANSETRON 2 MG/ML
4 INJECTION INTRAMUSCULAR; INTRAVENOUS EVERY 12 HOURS PRN
Status: DISCONTINUED | OUTPATIENT
Start: 2018-03-06 | End: 2018-03-06 | Stop reason: HOSPADM

## 2018-03-06 RX ORDER — GLYCOPYRROLATE 0.2 MG/ML
INJECTION INTRAMUSCULAR; INTRAVENOUS
Status: DISCONTINUED | OUTPATIENT
Start: 2018-03-06 | End: 2018-03-06

## 2018-03-06 RX ORDER — PROTAMINE SULFATE 10 MG/ML
INJECTION, SOLUTION INTRAVENOUS
Status: DISCONTINUED | OUTPATIENT
Start: 2018-03-06 | End: 2018-03-06

## 2018-03-06 RX ORDER — SODIUM CHLORIDE 0.9 % (FLUSH) 0.9 %
3 SYRINGE (ML) INJECTION
Status: DISCONTINUED | OUTPATIENT
Start: 2018-03-06 | End: 2018-03-06 | Stop reason: HOSPADM

## 2018-03-06 RX ORDER — PROPOFOL 10 MG/ML
VIAL (ML) INTRAVENOUS
Status: DISCONTINUED | OUTPATIENT
Start: 2018-03-06 | End: 2018-03-06

## 2018-03-06 RX ORDER — LIDOCAINE HCL/PF 100 MG/5ML
SYRINGE (ML) INTRAVENOUS
Status: DISCONTINUED | OUTPATIENT
Start: 2018-03-06 | End: 2018-03-06

## 2018-03-06 RX ORDER — DIPHENHYDRAMINE HCL 50 MG
50 CAPSULE ORAL ONCE
Status: COMPLETED | OUTPATIENT
Start: 2018-03-06 | End: 2018-03-06

## 2018-03-06 RX ADMIN — FENTANYL CITRATE 50 MCG: 50 INJECTION, SOLUTION INTRAMUSCULAR; INTRAVENOUS at 10:03

## 2018-03-06 RX ADMIN — PROPOFOL 10 MG: 10 INJECTION, EMULSION INTRAVENOUS at 11:03

## 2018-03-06 RX ADMIN — GLYCOPYRROLATE 0.2 MG: 0.2 INJECTION INTRAMUSCULAR; INTRAVENOUS at 10:03

## 2018-03-06 RX ADMIN — DEXMEDETOMIDINE HYDROCHLORIDE 1 MCG/KG/HR: 100 INJECTION, SOLUTION, CONCENTRATE INTRAVENOUS at 10:03

## 2018-03-06 RX ADMIN — HEPARIN SODIUM 7000 UNITS: 1000 INJECTION INTRAVENOUS; SUBCUTANEOUS at 11:03

## 2018-03-06 RX ADMIN — LIDOCAINE HYDROCHLORIDE 50 MG: 20 INJECTION, SOLUTION INTRAVENOUS at 11:03

## 2018-03-06 RX ADMIN — DEXTROSE AND SODIUM CHLORIDE: 5; .45 INJECTION, SOLUTION INTRAVENOUS at 09:03

## 2018-03-06 RX ADMIN — HEPARIN SODIUM 3000 UNITS: 1000 INJECTION INTRAVENOUS; SUBCUTANEOUS at 11:03

## 2018-03-06 RX ADMIN — SODIUM CHLORIDE: 0.9 INJECTION, SOLUTION INTRAVENOUS at 10:03

## 2018-03-06 RX ADMIN — PROTAMINE SULFATE 100 MG: 10 INJECTION, SOLUTION INTRAVENOUS at 11:03

## 2018-03-06 RX ADMIN — DEXMEDETOMIDINE HYDROCHLORIDE 57 MCG: 100 INJECTION, SOLUTION, CONCENTRATE INTRAVENOUS at 10:03

## 2018-03-06 RX ADMIN — DIPHENHYDRAMINE HYDROCHLORIDE 50 MG: 50 CAPSULE ORAL at 09:03

## 2018-03-06 RX ADMIN — SODIUM CHLORIDE, SODIUM GLUCONATE, SODIUM ACETATE, POTASSIUM CHLORIDE, MAGNESIUM CHLORIDE, SODIUM PHOSPHATE, DIBASIC, AND POTASSIUM PHOSPHATE: .53; .5; .37; .037; .03; .012; .00082 INJECTION, SOLUTION INTRAVENOUS at 10:03

## 2018-03-06 NOTE — PROGRESS NOTES
Dr salgado with anesthesia notified that pt is awake to verbal stimuli.  maynor score remains 7.  Unable to wean oxygen.  Pt remains on 2l nc.  No distress.  Pt tolerating po intake in ep pacu.  Per dr salgado, ok to release pt to sscu.

## 2018-03-06 NOTE — HOSPITAL COURSE
Ms. Nunez was admitted and underwent successful closure of a large PVL anterior and to the R coronary sinus of a bioprosthetic aortic valve with a 12mm  II. Central vavlular AI persisted post PVL closure. There were no complications and she returned to the SSCU in stable condition. Following completion of her post-cath protocol, she ambulated without difficulty. She was eager to go home. It was felt she was stable for discharge.      Prior to her procedure, she complained of worsening redness and swelling in her left breast. She had similar symptoms during her recent hospitalization and was prescribed doxycycline for 2 weeks with no improvement. She was noted to have nipple retraction and peau d'orange on physical exam. Drs Juan M and Jesus with CTS examined the patient and were concerned for malignancy. General Surgery was consulted; she is scheduled for an outpatient mammogram on Thursday, 3/8. She will see Dr. Lucero that day as well.

## 2018-03-06 NOTE — SUBJECTIVE & OBJECTIVE
Past Medical History:   Diagnosis Date    Abdominal aneurysm     Anemia     Bacterial meningitis     Cancer     lung cancer    CHF (congestive heart failure)     Coronary artery disease     Hyperlipidemia     Hypertension        Past Surgical History:   Procedure Laterality Date    artificial valve      CARDIAC PACEMAKER PLACEMENT      CORONARY ARTERY BYPASS GRAFT      EYE SURGERY      LUNG REMOVAL, PARTIAL Left     pernament pacer         Review of patient's allergies indicates:   Allergen Reactions    Codeine Nausea Only       PTA Medications   Medication Sig    aspirin (ECOTRIN) 81 MG EC tablet Take 81 mg by mouth once daily.    atorvastatin (LIPITOR) 80 MG tablet Take 80 mg by mouth once daily.    clopidogrel (PLAVIX) 75 mg tablet Take 75 mg by mouth once daily.    FOLIC ACID/MULTIVIT-MIN/LUTEIN (CENTRUM SILVER ORAL) Take by mouth.    furosemide (LASIX) 40 MG tablet Take 1 tablet (40 mg total) by mouth 2 (two) times daily. If wt gain 2-3 lbs x 2-5 days, take TID. If no improvement, call MD    lisinopril (PRINIVIL,ZESTRIL) 20 MG tablet Take 20 mg by mouth once daily.    multivitamin (THERAGRAN) tablet Take 1 tablet by mouth once daily.    omega 3-dha-epa-fish oil (FISH OIL) 100-160-1,000 mg Cap Take by mouth.    omeprazole (PRILOSEC) 20 MG capsule Take 20 mg by mouth once daily.    sertraline (ZOLOFT) 25 MG tablet Take 25 mg by mouth once daily.    meclizine (ANTIVERT) 32 MG tablet Take 32 mg by mouth 3 (three) times daily as needed.     Family History     Problem Relation (Age of Onset)    Diabetes Mother    Heart failure Mother    Intracerebral hemorrhage Father        Social History Main Topics    Smoking status: Former Smoker    Smokeless tobacco: Never Used    Alcohol use No    Drug use: No    Sexual activity: Not on file     Review of Systems   Constitution: Negative for chills, diaphoresis, fever, weakness, weight gain and weight loss.   HENT: Negative for sore throat.     Eyes: Negative for blurred vision, vision loss in left eye, vision loss in right eye and visual disturbance.   Cardiovascular: Positive for dyspnea on exertion, leg swelling and orthopnea. Negative for chest pain, claudication, near-syncope, palpitations, paroxysmal nocturnal dyspnea and syncope.   Respiratory: Negative for cough, hemoptysis, shortness of breath, sputum production and wheezing.    Endocrine: Negative for cold intolerance and heat intolerance.   Hematologic/Lymphatic: Negative for adenopathy. Does not bruise/bleed easily.   Skin: Negative for rash.   Musculoskeletal: Negative for falls, muscle weakness and myalgias.   Gastrointestinal: Negative for abdominal pain, change in bowel habit, constipation, diarrhea, melena and nausea.   Genitourinary: Negative for bladder incontinence.   Neurological: Negative for dizziness, focal weakness, headaches, light-headedness and numbness.   Psychiatric/Behavioral: Negative for altered mental status.     Objective:     Vital Signs (Most Recent):  Temp: 97.8 °F (36.6 °C) (03/06/18 0908)  Pulse: 63 (03/06/18 0908)  Resp: 18 (03/06/18 0908)  BP: (!) 140/57 (03/06/18 0910)  SpO2: (!) 92 % (03/06/18 0908) Vital Signs (24h Range):  Temp:  [97.8 °F (36.6 °C)] 97.8 °F (36.6 °C)  Pulse:  [63] 63  Resp:  [18] 18  SpO2:  [92 %] 92 %  BP: (136-140)/(57-60) 140/57     Weight: 57.6 kg (127 lb)  Body mass index is 21.8 kg/m².    SpO2: (!) 92 %  O2 Device (Oxygen Therapy): room air    No intake or output data in the 24 hours ending 03/06/18 0928    Lines/Drains/Airways          No matching active lines, drains, or airways          Physical Exam   Constitutional: She is oriented to person, place, and time. She appears well-developed and well-nourished. No distress.   HENT:   Head: Normocephalic and atraumatic.   Mouth/Throat: Oropharynx is clear and moist.   Eyes: Conjunctivae and EOM are normal. Pupils are equal, round, and reactive to light. No scleral icterus.   Neck: Neck  supple. No JVD present. No tracheal deviation present.   Cardiovascular: Normal rate and regular rhythm.  Exam reveals no gallop and no friction rub.    Murmur heard.  Pulmonary/Chest: Effort normal and breath sounds normal. No respiratory distress. She has no wheezes. She has no rales. She exhibits no tenderness.   Abdominal: Soft. Bowel sounds are normal. She exhibits no distension. There is no hepatosplenomegaly. There is no tenderness.   Musculoskeletal: She exhibits no edema or tenderness.   Neurological: She is alert and oriented to person, place, and time.   Skin: Skin is warm and dry. No rash noted. No erythema.   Left breast with nipple retraction and peau d'orange.    Psychiatric: She has a normal mood and affect. Her behavior is normal.       Significant Labs: BMP: No results for input(s): GLU, NA, K, CL, CO2, BUN, CREATININE, CALCIUM, MG in the last 48 hours. and CBC No results for input(s): WBC, HGB, HCT, PLT in the last 48 hours.

## 2018-03-06 NOTE — PLAN OF CARE
Problem: Patient Care Overview  Goal: Plan of Care Review  Outcome: Ongoing (interventions implemented as appropriate)  Patient arrived to room. PIV placed. Admit assessment completed. Plan of care discussed with patient. Will monitor.  Intense emotional support provided.   and 2 daughters at bedside.

## 2018-03-06 NOTE — PLAN OF CARE
Vss. sats 93% on 2l nc.  No distress.  Upon transfer to sscu, pt awake and alert.  Pt's daughter jean marie updated multiple times over phone and voicemail.  Right groin drsg cdi and guaze/trans. Film noted. No drainage or hematoma. See flowsheet for full assessment.

## 2018-03-06 NOTE — TRANSFER OF CARE
"Anesthesia Transfer of Care Note    Patient: Delmi Nunez    Procedure(s) Performed: Procedure(s) (LRB):  Aortic paravalvular leak closure (N/A)    Patient location: PACU    Anesthesia Type: MAC    Transport from OR: Transported from OR on 2-3 L/min O2 by NC with adequate spontaneous ventilation. Continuous ECG monitoring in transport. Continuous SpO2 monitoring in transport    Post pain: adequate analgesia    Post assessment: no apparent anesthetic complications    Post vital signs: stable    Level of consciousness: awake, alert and oriented    Nausea/Vomiting: no nausea/vomiting    Complications: none    Transfer of care protocol was followed      Last vitals:   Visit Vitals  BP (!) 140/57 (BP Location: Left arm, Patient Position: Lying)   Pulse 63   Temp 36.6 °C (97.8 °F) (Oral)   Resp 18   Ht 5' 4" (1.626 m)   Wt 57.6 kg (127 lb)   SpO2 (!) 92%   Breastfeeding? No   BMI 21.80 kg/m²     "

## 2018-03-06 NOTE — NURSING TRANSFER
Nursing Transfer Note      3/6/2018     Transfer To: 315 from ep pacu 3    Transfer via stretcher    Transfer with cardiac monitoring, tele box placed 315    Transported by lenard sampson    Medicines sent: none    Chart send with patient: Yes    Notified: daughter    Patient reassessed at: 3/6/18 1500    Upon arrival to floor: cardiac monitor applied, patient oriented to room, call bell in reach and bed in lowest position

## 2018-03-06 NOTE — ANESTHESIA POSTPROCEDURE EVALUATION
"Anesthesia Post Evaluation    Patient: Delmi Nunez    Procedure(s) Performed: Procedure(s) (LRB):  Aortic paravalvular leak closure (N/A)    Final Anesthesia Type: general  Patient location during evaluation: PACU  Patient participation: Yes- Able to Participate  Level of consciousness: awake  Post-procedure vital signs: reviewed and stable  Pain management: adequate  Airway patency: patent  PONV status at discharge: No PONV  Anesthetic complications: no      Cardiovascular status: hemodynamically stable  Respiratory status: unassisted  Hydration status: euvolemic  Follow-up not needed.        Visit Vitals  /62   Pulse (!) 59   Temp 36.6 °C (97.8 °F) (Temporal)   Resp 16   Ht 5' 4" (1.626 m)   Wt 57.6 kg (127 lb)   SpO2 (!) 93%   Breastfeeding? No   BMI 21.80 kg/m²       Pain/Lamberto Score: Pain Assessment Performed: Yes (3/6/2018  3:00 PM)  Presence of Pain: denies (3/6/2018  2:00 PM)  Pain Rating Prior to Med Admin: 0 (3/6/2018  2:00 PM)  Lamberto Score: 7 (3/6/2018  3:00 PM)      "

## 2018-03-06 NOTE — HPI
"Mrs. Nunez is a pleasant 84 yo lady initially scheduled to see us in clinic on 2/26. We recently evaluated her , Papa, for TAVR but he does not qualify at this time. 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 She states she felt "great" after her initial valve replacement/CABG in 2003 -- she was very active around the house and could do all of her activities without difficulty. About 5-6 months ago, she began to develop AVILES. She was admitted to  in September with decompensated heart failure and anemia. She was transfused, diuresed, and discharged home. She was admitted to [facility] again with decompensated heart failure and discharged on 1/29/18 with oxygen 2L/NC.   She noted no improvement in her AVILES since her discharge 2 weeks ago -- she has PND, orthopnea, and LE edema and she SOB ambulating to her bathroom in the house. She presented to the ED at [facility] on 2/12 with decompensated heart failure. A IVAN showed severe paravalvular aortic regurgitation with large, anterior PVL ( between LCC and RCC and under RCC).  The regurgitant jet splits into two jets diagnoally across the LVOT. She was thus scheduled for PVL closure.   "

## 2018-03-06 NOTE — PLAN OF CARE
Problem: Patient Care Overview  Goal: Plan of Care Review  Outcome: Ongoing (interventions implemented as appropriate)  Received report from Clarice. Patient s/p PVL closure, AAOx3. VSS, no c/o pain or discomfort at this time, resp even and unlabored. Gauze/tegaderm dressing to R groin is CDI. No active bleeding. No hematoma noted. Post procedure protocol reviewed with patient and patient's family. Understanding verbalized. Family members at bedside. Nurse call bell within reach. Will continue to monitor per post procedure protocol.

## 2018-03-06 NOTE — DISCHARGE SUMMARY
"[facility]  Interventional Cardiology  Discharge Summary      Patient Name: Delmi Nunez  MRN: [id number]  Admission Date: 3/6/2018  Hospital Length of Stay: 0 days  Discharge Date and Time:  03/06/2018 4:56 PM  Attending Physician: Abdulkadir Cobos MD  Discharging Provider: Zara Melendez PA-C  Primary Care Physician: Primary Doctor No    HPI:  Mrs. Nunez is a pleasant 84 yo lady initially scheduled to see us in clinic on 2/26. We recently evaluated her , Papa, for TAVR but he does not qualify at this time. 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 She states she felt "great" after her initial valve replacement/CABG in 2003 -- she was very active around the house and could do all of her activities without difficulty. About 5-6 months ago, she began to develop AVILES. She was admitted to  in September with decompensated heart failure and anemia. She was transfused, diuresed, and discharged home. She was admitted to [facility] again with decompensated heart failure and discharged on 1/29/18 with oxygen 2L/NC.   She noted no improvement in her AVILES since her discharge 2 weeks ago -- she has PND, orthopnea, and LE edema and she SOB ambulating to her bathroom in the house. She presented to the ED at [facility] on [place]12 with decompensated heart failure. A IVNA showed severe paravalvular aortic regurgitation with large, anterior PVL ( between LCC and RCC and under RCC).  The regurgitant jet splits into two jets diagnoally across the LVOT. She was thus scheduled for PVL closure.     Procedure(s) (LRB):  Aortic paravalvular leak closure " (N/A)     Indwelling Lines/Drains at time of discharge:  Lines/Drains/Airways          No matching active lines, drains, or airways          Hospital Course:  Ms. Nunez was admitted and underwent successful closure of a large PVL anterior and to the R coronary sinus of a bioprosthetic aortic valve with a 12mm  II. Central vavlular AI persisted post PVL closure. There were no complications and she returned to the [facility] in stable condition. Following completion of her post-cath protocol, she ambulated without difficulty. She was eager to go home. It was felt she was stable for discharge.      Prior to her procedure, she complained of worsening redness and swelling in her left breast. She had similar symptoms during her recent hospitalization and was prescribed doxycycline for 2 weeks with no improvement. She was noted to have nipple retraction and peau d'orange on physical exam. Attila Mcgowan and Jesus with CTS examined the patient and were concerned for malignancy. General Surgery was consulted; she is scheduled for an outpatient mammogram on Thursday, 3/8. She will see Dr. Lucero that day as well.         Paravalvular leak (prosthetic valve)    IVAN shows severe paravalvular aortic regurgitation with large, anterior PVL ( between LCC and RCC and under RCC). The regurgitant jet splits into two jets diagnoally across the LVOT.   S/p successful closure with a 12mm  II on 3/6/18.            Nonrheumatic aortic valve stenosis    HILARY = 0.92 cm2, AVAi = 0.56 cm2/m2, peak velocity = 4.21 m/s, mean gradient = 46 mmHg, EF= 60%.  Can proceed with TAVR work-up after her PVL closure.   She will need TAVR CTA and PFTs.  High risk per Dr. Witt due to age, redo status and history of lung CA.   STS= 9.9%  Frailty: 2/4 (albumin and walk)        Non-rheumatic mitral regurgitation     IVAN shows tethered posterior leaflet with severe mitral regurgitation with a large centrally originating and posteriorly directed regugitant  "jet.  Likely ischemic MR (patient has SVG to RCA).             Atherosclerosis of aorta    Seen on outpatient IVAN (in Media tab).   On ASA and statin.         History of lung cancer     Follows with Dr. Berrios at .   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   Hx of multiple lung nodules.   PET in July 2017 shows "increase in RUL nodular lesion from 1.1 cm to 1.4 cm with increased SUV 8, increased R apical nodule from 1 cm to 1.5 cm without SUV uptake. There is also nodular focuse of increased SUV activity posterior to the descending aorta about 3.7."  Per Dr. Berrios's note (in Media Tab): "right upper lobe mass increase can be due to ongoing fibrosis (she has had 2 biopsies which are negative for malignancy). This should not impede her aortic valve repair. She will receive a chest CT in 3 months, even if she has a cancer in the RUL despite having 2 negative biopsies, she is still a candidate for stereotactic radiation surgery."             Pacemaker    Placed at time of AVR + CABG in 2003.   Stable.         Acute on chronic respiratory failure with hypoxemia    On Home O2 prn, started 1/29/18.   Currently on room air.         Protein calorie malnutrition    Albumin 2.8.   Frailty 2/4.        Erythema of breast    Was treated with Doxycycline x 2 weeks after her hospital discharge -- no improvement in sx.   Concerning for malignant process.  Outpatient mammogram and appt with Dr. Lucero on 3/8/18.        Anemia     Labs c/w hemolysis.              Hyperlipemia    Stable.  On statin.        Essential hypertension    Controlled on lisinopril.        CAD (coronary artery disease)    S/p single vessel CABG at the time of AVR in 2003.   Patent graft by [facility] in Nov 2017. Otherwise non-obstructive CAD.  On ASA and statin. No b-blocker for unknown reason -- will defer to primary cardiologist.         Acute on chronic congestive heart failure    Well " compensated clinically at this time.   On Lasix 40mg BID.             Discharged Condition: stable    Follow Up:    Patient Instructions:     Call MD for:  temperature >100.4     Call MD for:  persistent nausea and vomiting or diarrhea     Call MD for:  severe uncontrolled pain     Call MD for:  redness, tenderness, or signs of infection (pain, swelling, redness, odor or green/yellow discharge around incision site)     Call MD for:  difficulty breathing or increased cough     Call MD for:  severe persistent headache     Call MD for:  worsening rash     Call MD for:  persistent dizziness, light-headedness, or visual disturbances     Call MD for:  increased confusion or weakness     Activity as tolerated     Lifting restrictions   Order Comments: No lifting more than 5 lbs for 5 days.       Medications:  Reconciled Home Medications:   Current Discharge Medication List      CONTINUE these medications which have NOT CHANGED    Details   aspirin (ECOTRIN) 81 MG EC tablet Take 81 mg by mouth once daily.      atorvastatin (LIPITOR) 80 MG tablet Take 80 mg by mouth once daily.      clopidogrel (PLAVIX) 75 mg tablet Take 75 mg by mouth once daily.      FOLIC ACID/MULTIVIT-MIN/LUTEIN (CENTRUM SILVER ORAL) Take by mouth.      furosemide (LASIX) 40 MG tablet Take 1 tablet (40 mg total) by mouth 2 (two) times daily. If wt gain 2-3 lbs x 2-5 days, take TID. If no improvement, call MD  Qty: 60 tablet, Refills: 0      lisinopril (PRINIVIL,ZESTRIL) 20 MG tablet Take 20 mg by mouth once daily.      multivitamin (THERAGRAN) tablet Take 1 tablet by mouth once daily.      omega 3-dha-epa-fish oil (FISH OIL) 100-160-1,000 mg Cap Take by mouth.      omeprazole (PRILOSEC) 20 MG capsule Take 20 mg by mouth once daily.      sertraline (ZOLOFT) 25 MG tablet Take 25 mg by mouth once daily.      meclizine (ANTIVERT) 32 MG tablet Take 32 mg by mouth 3 (three) times daily as needed.             Time spent on the discharge of patient: 32  minutes    Zara Melendez PA-C  Interventional Cardiology  [facility]

## 2018-03-06 NOTE — H&P
"[facility]  Interventional Cardiology  H&P    Patient Name: Delmi Nunez  MRN: [id number]  Admission Date: 3/6/2018  Code Status: Prior   Attending Provider: Abdulkadir Cobos MD   Primary Care Physician: Primary Doctor No  Principal Problem:<principal problem not specified>      Subjective:     HPI:  Mrs. Nunez is a pleasant 84 yo lady initially scheduled to see us in clinic on 2/26. We recently evaluated her , Papa, for TAVR but he does not qualify at this time. 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 She states she felt "great" after her initial valve replacement/CABG in 2003 -- she was very active around the house and could do all of her activities without difficulty. About 5-6 months ago, she began to develop AVILES. She was admitted to  in September with decompensated heart failure and anemia. She was transfused, diuresed, and discharged home. She was admitted to [facility] again with decompensated heart failure and discharged on 1/29/18 with oxygen 2L/NC.   She noted no improvement in her AVILES since her discharge 2 weeks ago -- she has PND, orthopnea, and LE edema and she SOB ambulating to her bathroom in the house. She presented to the ED at [facility] on 2/12 with decompensated heart failure. A IVAN showed severe paravalvular aortic regurgitation with large, anterior PVL ( between LCC and RCC and under RCC).  The regurgitant jet splits into two jets diagnoally across the LVOT. She was thus scheduled for PVL closure.     Past Medical History:   Diagnosis Date    Abdominal aneurysm     Anemia     Bacterial meningitis     " Cancer     lung cancer    CHF (congestive heart failure)     Coronary artery disease     Hyperlipidemia     Hypertension        Past Surgical History:   Procedure Laterality Date    artificial valve      CARDIAC PACEMAKER PLACEMENT      CORONARY ARTERY BYPASS GRAFT      EYE SURGERY      LUNG REMOVAL, PARTIAL Left     pernament pacer         Review of patient's allergies indicates:   Allergen Reactions    Codeine Nausea Only       PTA Medications   Medication Sig    aspirin (ECOTRIN) 81 MG EC tablet Take 81 mg by mouth once daily.    atorvastatin (LIPITOR) 80 MG tablet Take 80 mg by mouth once daily.    clopidogrel (PLAVIX) 75 mg tablet Take 75 mg by mouth once daily.    FOLIC ACID/MULTIVIT-MIN/LUTEIN (CENTRUM SILVER ORAL) Take by mouth.    furosemide (LASIX) 40 MG tablet Take 1 tablet (40 mg total) by mouth 2 (two) times daily. If wt gain 2-3 lbs x 2-5 days, take TID. If no improvement, call MD    lisinopril (PRINIVIL,ZESTRIL) 20 MG tablet Take 20 mg by mouth once daily.    multivitamin (THERAGRAN) tablet Take 1 tablet by mouth once daily.    omega 3-dha-epa-fish oil (FISH OIL) 100-160-1,000 mg Cap Take by mouth.    omeprazole (PRILOSEC) 20 MG capsule Take 20 mg by mouth once daily.    sertraline (ZOLOFT) 25 MG tablet Take 25 mg by mouth once daily.    meclizine (ANTIVERT) 32 MG tablet Take 32 mg by mouth 3 (three) times daily as needed.     Family History     Problem Relation (Age of Onset)    Diabetes Mother    Heart failure Mother    Intracerebral hemorrhage Father        Social History Main Topics    Smoking status: Former Smoker    Smokeless tobacco: Never Used    Alcohol use No    Drug use: No    Sexual activity: Not on file     Review of Systems   Constitution: Negative for chills, diaphoresis, fever, weakness, weight gain and weight loss.   HENT: Negative for sore throat.    Eyes: Negative for blurred vision, vision loss in left eye, vision loss in right eye and visual disturbance.    Cardiovascular: Positive for dyspnea on exertion, leg swelling and orthopnea. Negative for chest pain, claudication, near-syncope, palpitations, paroxysmal nocturnal dyspnea and syncope.   Respiratory: Negative for cough, hemoptysis, shortness of breath, sputum production and wheezing.    Endocrine: Negative for cold intolerance and heat intolerance.   Hematologic/Lymphatic: Negative for adenopathy. Does not bruise/bleed easily.   Skin: Negative for rash.   Musculoskeletal: Negative for falls, muscle weakness and myalgias.   Gastrointestinal: Negative for abdominal pain, change in bowel habit, constipation, diarrhea, melena and nausea.   Genitourinary: Negative for bladder incontinence.   Neurological: Negative for dizziness, focal weakness, headaches, light-headedness and numbness.   Psychiatric/Behavioral: Negative for altered mental status.     Objective:     Vital Signs (Most Recent):  Temp: 97.8 °F (36.6 °C) (03/06/18 0908)  Pulse: 63 (03/06/18 0908)  Resp: 18 (03/06/18 0908)  BP: (!) 140/57 (03/06/18 0910)  SpO2: (!) 92 % (03/06/18 0908) Vital Signs (24h Range):  Temp:  [97.8 °F (36.6 °C)] 97.8 °F (36.6 °C)  Pulse:  [63] 63  Resp:  [18] 18  SpO2:  [92 %] 92 %  BP: (136-140)/(57-60) 140/57     Weight: 57.6 kg (127 lb)  Body mass index is 21.8 kg/m².    SpO2: (!) 92 %  O2 Device (Oxygen Therapy): room air    No intake or output data in the 24 hours ending 03/06/18 0928    Lines/Drains/Airways          No matching active lines, drains, or airways          Physical Exam   Constitutional: She is oriented to person, place, and time. She appears well-developed and well-nourished. No distress.   HENT:   Head: Normocephalic and atraumatic.   Mouth/Throat: Oropharynx is clear and moist.   Eyes: Conjunctivae and EOM are normal. Pupils are equal, round, and reactive to light. No scleral icterus.   Neck: Neck supple. No JVD present. No tracheal deviation present.   Cardiovascular: Normal rate and regular rhythm.  Exam  reveals no gallop and no friction rub.    Murmur heard.  Pulmonary/Chest: Effort normal and breath sounds normal. No respiratory distress. She has no wheezes. She has no rales. She exhibits no tenderness.   Abdominal: Soft. Bowel sounds are normal. She exhibits no distension. There is no hepatosplenomegaly. There is no tenderness.   Musculoskeletal: She exhibits no edema or tenderness.   Neurological: She is alert and oriented to person, place, and time.   Skin: Skin is warm and dry. No rash noted. No erythema.   Left breast with nipple retraction and peau d'orange.    Psychiatric: She has a normal mood and affect. Her behavior is normal.       Significant Labs: BMP: No results for input(s): GLU, NA, K, CL, CO2, BUN, CREATININE, CALCIUM, MG in the last 48 hours. and CBC No results for input(s): WBC, HGB, HCT, PLT in the last 48 hours.      Assessment and Plan:     Paravalvular leak (prosthetic valve)    IVAN shows severe paravalvular aortic regurgitation with large, anterior PVL ( between LCC and RCC and under RCC). The regurgitant jet splits into two jets diagnoally across the LVOT.             Nonrheumatic aortic valve stenosis    HILARY = 0.92 cm2, AVAi = 0.56 cm2/m2, peak velocity = 4.21 m/s, mean gradient = 46 mmHg, EF= 60%.  Can proceed with TAVR work-up after her PVL closure.   She will need TAVR CTA and PFTs.  High risk per Dr. Witt due to age, redo status and history of lung CA.   STS= 9.9%  Frailty: 2/4 (albumin and walk)        Non-rheumatic mitral regurgitation     IVAN shows tethered posterior leaflet with severe mitral regurgitation with a large centrally originating and posteriorly directed regugitant jet.  Likely ischemic MR (patient has SVG to RCA).             Atherosclerosis of aorta    Seen on outpatient IVAN (in Media tab).   On ASA and statin.         History of lung cancer     Follows with Dr. Berrios at .   AYANNA adenocarcinoma s/p wedge resection in Jan 2015 and RML carcinoid s/p resection  "in April 2015.   Hx of multiple lung nodules.   PET in July 2017 shows "increase in RUL nodular lesion from 1.1 cm to 1.4 cm with increased SUV 8, increased R apical nodule from 1 cm to 1.5 cm without SUV uptake. There is also nodular focuse of increased SUV activity posterior to the descending aorta about 3.7."  Per Dr. Berrios's note (in Media Tab): "right upper lobe mass increase can be due to ongoing fibrosis (she has had 2 biopsies which are negative for malignancy). This should not impede her aortic valve repair. She will receive a chest CT in 3 months, even if she has a cancer in the RUL despite having 2 negative biopsies, she is still a candidate for stereotactic radiation surgery."             Pacemaker    Placed at time of AVR + CABG in 2003.   Stable.         Acute on chronic respiratory failure with hypoxemia    On Home O2 prn, started 1/29/18.   Currently on room air.         Protein calorie malnutrition    Albumin 2.8.   Frailty 2/4.        Erythema of breast    Was treated with Doxycycline x 2 weeks after her hospital discharge -- no improvement in sx.   Concerning for malignant process.  General Surgery to see today.         Anemia     Labs c/w hemolysis.              Hyperlipemia    Stable.  On statin.        Essential hypertension    Controlled on lisinopril.        CAD (coronary artery disease)    S/p single vessel CABG at the time of AVR in 2003.   Patent graft by [facility] in Nov 2017. Otherwise non-obstructive CAD.  On ASA and statin. No b-blocker for unknown reason -- will defer to primary cardiologist.         Acute on chronic congestive heart failure    Well compensated clinically at this time.   On Lasix 40mg BID.             PVL closure today.   Risks, Benefits, and alternatives of procedure were discussed in detail with the patient. Questions were answered. She is agreeable to proceed. Informed consent obtained.   General Surgery consult for left breast.     VTE Risk Mitigation     None    "       Zara Melendez PA-C  Interventional Cardiology   [facility]

## 2018-03-06 NOTE — PROGRESS NOTES
Zara Woodard notified that when patient ambulated in hallway, patient did become SOB and slightly lightheaded. When patient returned to room, O2 sats 83%. 2LNC applied and patient recovered to 93% pretty quickly. Patient states lightheadedness was subsiding after resting. Patient reports she does use PRN O2 at home frequently. No new orders. Patient still okay to discharge. Patient and family in agreement with plan.

## 2018-03-06 NOTE — PROGRESS NOTES
Dr jack siu with surgery at bedside.  Pt remains drowsy from anesthesia. Pt assessing pt in ep pacu 3.

## 2018-03-06 NOTE — H&P
TRANSESOPHAGEAL ECHOCARDIOGRAPHY   PRE-PROCEDURE NOTE    03/06/2018    HPI:     Delmi Nunez is a 83 y.o. woman with PMHx of HTN, bioprosthetic AVR + 1v CABG (SVG-RCA) in 2003, PPM, anemia with unclear source (negative EGD and C-Scope), lung CA  s/p AYANNA wedge resection and RUL carcinoid s/p resection, who now presents for a planned aV perivalvular leak closure by Dr. Cobos.    Patient was admitted in 2/2018 with ADHF, IVAN on 2/15/2018 showed severe anterior PVL between the LCC and the RCC under the RCC. Notably there was also severe MR and MAC with a tethered posterior leaflet.    IVAN now requested milan-procedure.    Dysphagia or odynophagia:  No  Liver Disease, esophageal disease, or known varices:  No  Upper GI Bleeding: No  Snoring:  No  Sleep Apnea:  No  Prior neck surgery or radiation:  No  History of anesthetic difficulties:  No  Family history of anesthetic difficulties:  No  Last oral intake:  12 hours ago  Able to move neck in all directions:  Yes      Meds:     Scheduled Meds:   diphenhydrAMINE  50 mg Oral Once     PRN Meds:  Continuous Infusions:   dextrose 5 % and 0.45 % NaCl         Physical Exam:     Vitals:  Temp:  [97.8 °F (36.6 °C)]   Pulse:  [63]   Resp:  [18]   BP: (136-140)/(57-60)   SpO2:  [92 %]        Constitutional: NAD, conversant  HEENT:   Sclera anicteric, Uvula midline, EOMI, OP clear  Neck:               No JVD, moves to all direction without any limitations  CV:               RRR, no murmurs / rubs / gallops, normal S1/S2  Pulm:               CTAB, no wheezes, rales, or ronchi  GI:               Abdomen soft, NTND, +BS  Extremities:              No LE edema, warm with palpable pulses  Neuro:   AAOX3, no focal motor deficits    Mallampati: 2  ASA: 3      Labs:     No results found for this or any previous visit (from the past 336 hour(s)).    No results found for this or any previous visit (from the past 336 hour(s)).    CrCl cannot be calculated (Patient's most recent lab  result is older than the maximum 7 days allowed.).    Imaging:  IVAN (2/15/2018)  CONCLUSIONS     1 - Left atrial appendage is bilobed with no visualized thrombus.     2 - Biatrial enlargement.     3 - Normal left ventricular systolic function (EF 60-65%).     4 - Severe aortic regurgitation.     5 - The mitral valve is mildly sclerotic with evidence of tethered posterior leaflet.     6 - Severe mitral regurgitation.     7 - Aortic valve prosthesis.     8 - Severe paravalvular aortic regurgitation ( see text).     9 - Mild tricuspid regurgitation.     10 - Grade 2 atheroma disease of aorta.     [id number]  V-paced rhythm, 68 bpm    Assessment & Plan:     PLAN:  1. IVAN for evaluation of perivalvular AV leak during PVL closure procedure.    -The risks, benefits & alternatives of the procedure were explained to the patient.    -The risks of transesophageal echo include but are not limited to:  Dental trauma, esophageal trauma/perforation, bleeding, laryngospasm/brochospasm, aspiration, sore throat/hoarseness, & dislodgement of the endotracheal tube/nasogastric tube (where applicable).    -The risks of moderate sedation include hypotension, respiratory depression, arrhythmias, bronchospasm, & death.    -Informed consent was obtained & the patient is agreeable to proceed with the procedure.    I will discuss with the attending physician. Attending addendum is to follow.    Signed:  Bruno Lowry MD  Cardiology Fellow - PGY5  Pager: [phone]  3/6/2018 9:49 AM

## 2018-03-06 NOTE — PROGRESS NOTES
Pt's daughter dilcia visiting pt in ep pacu.  She has to leave hospital to go back to work. Pt slight drowsy.  Other daughter updated over phone by pacu rn. Verbalizes understanding.

## 2018-03-06 NOTE — SIGNIFICANT EVENT
Asked to see the patient in preoperative holding for closure of PVL to confirm examination findings by Dr Cobos and NIA Melendez. She was previously treated with doxycycline with no improvement in exam.      Physical Exam   Skin: Skin is warm and dry. Capillary refill takes less than 2 seconds.          Recommended she see a breast surgeon as Dr Mcgowan and I no longer offer those services. 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     Jose Lee D.O.   Fellow in Cardiothoracic Surgery  PG VII  Pg [phone]  3/6/2018 1:31 PM

## 2018-03-07 LAB
POC ACTIVATED CLOTTING TIME K: 208 SEC (ref 74–137)
POC ACTIVATED CLOTTING TIME K: 224 SEC (ref 74–137)
SAMPLE: ABNORMAL
SAMPLE: ABNORMAL

## 2018-03-07 NOTE — PROGRESS NOTES
Pt DC'd per MD order. Discharge instructions given including activity,  wound care, S&S of infections, future appointments, and when to call MD. Medications reviewed including when to take next dose.  Telemetry and PIV DC'd, catheter tip intact. Pt to leave unit via wheelchair with family once dressed and ready.

## 2018-03-08 ENCOUNTER — HOSPITAL ENCOUNTER (OUTPATIENT)
Dept: RADIOLOGY | Facility: HOSPITAL | Age: 83
Discharge: HOME | End: 2018-03-08
Attending: SURGERY
Payer: MEDICARE

## 2018-03-08 ENCOUNTER — OFFICE VISIT (OUTPATIENT)
Dept: SURGERY | Facility: CLINIC | Age: 83
End: 2018-03-08
Payer: MEDICARE

## 2018-03-08 VITALS — HEIGHT: 62 IN | TEMPERATURE: 98 F

## 2018-03-08 DIAGNOSIS — N63.0 BREAST MASS: ICD-10-CM

## 2018-03-08 DIAGNOSIS — N64.9 DISORDER OF BREAST: ICD-10-CM

## 2018-03-08 DIAGNOSIS — R23.4 BREAST SKIN CHANGES: ICD-10-CM

## 2018-03-08 DIAGNOSIS — N64.89 BREAST EDEMA: ICD-10-CM

## 2018-03-08 DIAGNOSIS — N63.20 LEFT BREAST MASS: ICD-10-CM

## 2018-03-08 DIAGNOSIS — R23.4 BREAST SKIN CHANGES: Primary | ICD-10-CM

## 2018-03-08 PROCEDURE — 88305 TISSUE EXAM BY PATHOLOGIST: CPT | Performed by: PATHOLOGY

## 2018-03-08 PROCEDURE — 99999 PR PBB SHADOW E&M-EST. PATIENT-LVL II: CPT | Mod: PBBFAC,,, | Performed by: SURGERY

## 2018-03-08 PROCEDURE — 76642 ULTRASOUND BREAST LIMITED: CPT | Mod: 26,LT,, | Performed by: STUDENT IN AN ORGANIZED HEALTH CARE EDUCATION/TRAINING PROGRAM

## 2018-03-08 PROCEDURE — 76642 ULTRASOUND BREAST LIMITED: CPT | Mod: TC,PO,LT

## 2018-03-08 PROCEDURE — 99203 OFFICE O/P NEW LOW 30 MIN: CPT | Mod: S$GLB,,, | Performed by: SURGERY

## 2018-03-08 PROCEDURE — 77062 BREAST TOMOSYNTHESIS BI: CPT | Mod: 26,,, | Performed by: STUDENT IN AN ORGANIZED HEALTH CARE EDUCATION/TRAINING PROGRAM

## 2018-03-08 PROCEDURE — 88312 SPECIAL STAINS GROUP 1: CPT | Mod: 26,,, | Performed by: PATHOLOGY

## 2018-03-08 PROCEDURE — 77062 BREAST TOMOSYNTHESIS BI: CPT | Mod: TC,PO

## 2018-03-08 PROCEDURE — 77066 DX MAMMO INCL CAD BI: CPT | Mod: 26,,, | Performed by: STUDENT IN AN ORGANIZED HEALTH CARE EDUCATION/TRAINING PROGRAM

## 2018-03-11 PROBLEM — R23.4 BREAST SKIN CHANGES: Status: ACTIVE | Noted: 2018-03-11

## 2018-03-11 PROBLEM — N64.89 BREAST EDEMA: Status: ACTIVE | Noted: 2018-03-11

## 2018-03-11 NOTE — PROGRESS NOTES
Patient with recent admission and discharge as noted below.  We were asked to see patient for B breast edema and hyperemia during recent admission.    In order to facilitate imaging and work up we elected to see her in clinic today.          Attending Provider on Discharge: Gisela Cobos MD  [facility] Medicine Team: [facility]  Date of Admission:  2/12/2018     Date of Discharge:  2/17/2018            Active Hospital Problems     Diagnosis   POA    *Acute on chronic congestive heart failure [I50.9]   Yes    Paravalvular leak of prosthetic heart valve [T82.03XA]   Yes    Non-rheumatic mitral regurgitation [I34.0]   Yes    Nonrheumatic aortic valve stenosis [I35.0]   Yes    Atherosclerosis of aorta [I70.0]   Yes    Protein calorie malnutrition [E46]   Yes    Elevated LFTs [R79.89]   Yes    Acute on chronic respiratory failure with hypoxemia [J96.21]   Yes    Mild malnutrition [E44.1]   Yes    Pacemaker [Z95.0]   Yes       '03 for heart block       History of lung cancer [Z85.118]   Not Applicable       Pulm: Dr Berrios at [facility]       S/P AVR (aortic valve replacement) [Z95.2]   Not Applicable       [place]ov[place] '03       Weight loss [R63.4]   Yes    CAD (coronary artery disease) [I25.10]   Yes    Essential hypertension [I10]   Yes    Anemia [D64.9]   Yes    Erythema of breast [L53.9]   Yes    Hyperlipemia [E78.5]   Yes       Resolved Hospital Problems     Diagnosis Date Resolved POA   No resolved problems to display.         History of the Present Illness:       Mrs. Nunez is a pleasant 82yo  female with history of CHF (unknown details), chronic resp failure on home O2 2L x 2 weeks, HTN, CAD (?details), HTN, HLD, AAA (?details), h/o lung cancer (adenocarcinoma, ?carcinoid) s/p wedge and partial lobectomy '15, and anemia who presented to ED with shortness of breath with activity and at rest and B LE edema.  She was recently admitted to [facility] for same complaints and was discharged  "on 1/29/18 with home oxygen 2L/NC.  She states her Lasix has recently been increased from 20mg once daily to 40mg once daily within last week. She denies CP, cough, fever, chills, headache, and sputum production. She also reports redness of left breast and bruising of her left foot x 1 day.  She states she noticed these this morning when she woke up.  She denies numbness, pain, or trauma to either area; denies discharge from left breast     Hospital Course of Principle Problem Addressed:        Admitted to [facility], started on Lasix 40 IV BID. net -780 cc x 12 hours, continues with AVILES. The next day, net -625cc, weight unchanged, increased Lasix to 60 IV TID. Valve team consulted. 2D Echo done, with results as below.    Improved diuresis, net -2000.  Weight decreased to 128 from 132. C/O B leg cramps. IVAN done and revealed "severe paravalvular aortic regurgitation. There is a bioprosthetic aortic valve with large, anterior PVL ( between LCC and RCC and under RCC).  The regurgitant jet splits into two jets diagnoally across the LVOT."  Valve team plans for outpatient PVL closure in the near future, then TAVR workup.  Continue diuresis, have a lot of fluid still to diurese, aim for d/c home Monday    The day before discharge, she looked and felt great, and she was approaching euvolemia so transitioned to oral lasix with possible d/c home tomorrow if no re-accumulation of fluid. CTS consult per Valve/Interventinal recs. Scheduled for PVL closure next Weds.        Other Medical Problems Addressed in the Hospital:      Acute on chronic systolic CHF  Severe AS  Paravalvular AV leak  Chronic hypoxemic respiratory failure on home O2 2L  - no prior records on file - previously received care at   - Lasix 60 IV TID --> transitioned to PO the day before d/c home and monitored; she did well with net -800cc over the next 24 hours, thus she did not reaccumulate fluid and was ok for d/c home with Lasix sliding scale  - outpt PVL " repair with Dr Panda Cobos next Weds  - Continue oxygen 2L/NC and wean as tolerated to maintain saturations greater than 92%  - daily wt  - strict I/O's   - 1.5L fluid restriction while inpt   - telemetry  - keep Mg >2, K >4     L breast erythema - DDx cellulitis, other. Does not appear classic for peau d'orange. Negative fever and tenderness. 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 Unclear etiology - had EGD and C-scope at [facility] with no source found; VCE not done. No BPR, no other blood loss. Recent transfusion of 2u pRBCs at [facility] 1/25. Fe studies unremarkable (ferritin wnl, TIBC wnl) except Fe sat low at 14%  - Valve repair ASAP as above, expect improvement thereafter  - defer to PCP for further workup     Essential HTN  - home Lisinopril 20  - NO BB for now (not on BB at home and may need to give positive inotropic support)     CAD - ?details  - troponin elevated 0.413, denies CP, EKG without obvious ischemic changes, likely related to worsening cardiac function, trend Q 6 x3     Transaminitis - likely congestive hepatopathy  - improved with diuresis  - monitored daily CMP     Abnormal thyroid labs - TSH slightly elevated but FT4 wnl  - re-check TFTs when out of acute setting     Elevated A1C - 6.3 - prediabetes  - counseled on diet and lifestyle modifications     Weight loss - 30 pounds in 3-6 months. Prior weight 157 --> 128 (dry weight)     HypoMg, symptomatic with leg cramps - replaced IV      HypoK - replace PO     Mild malnutrition - Alb 3.0. Prealbumin wnl.  - Boost plus vanilla per nutrition consult  - MVI     Deconditioning   - high risk of falls; utilize all safety measures and fall precautions   - PT/OT     PPX: SCDs; hep 5K q8     Significant diagnostic tests      No results for input(s): WBC, HGB, HCT, PLT in the last 168  hours.  No results for input(s): NA, K, CL, CO2, BUN, CREATININE, CALCIUM, MG, PHOS, PROT, BILITOT, ALKPHOS, ALT, AST, GLUCOSE in the last 168 hours.     Invalid input(s): LABALBU     Other diagnostic tests:  2/14 2D Echo with CFD CONCLUSIONS     1 - Upper limit of normal left ventricular enlargement.     2 - Normal left ventricular systolic function (EF 60-65%).     3 - Eccentric hypertrophy.     4 - Wall motion abnormalities.     5 - Right ventricle is upper limit of normal in size with not well seen systolic function.     6 - Biatrial enlargement.     7 - Moderate to severe aortic stenosis, HILARY = 0.92 cm2, AVAi = 0.56 cm2/m2, peak velocity = 4.21 m/s, mean gradient = 46 mmHg.     8 - Mild aortic regurgitation.     9 - Moderate to severe mitral regurgitation.     10 - Mild tricuspid regurgitation.     11 - Increased central venous pressure.     12 - Pulmonary hypertension. The estimated PA systolic pressure is 49 mmHg.     2/15 IVAN  CONCLUSIONS     1 - Left atrial appendage is bilobed with no visualized thrombus.     2 - Biatrial enlargement.     3 - Normal left ventricular systolic function (EF 60-65%).     4 - Severe aortic regurgitation.     5 - The mitral valve is mildly sclerotic with evidence of tethered posterior leaflet.     6 - Severe mitral regurgitation.     7 - Aortic valve prosthesis.     8 - Severe paravalvular aortic regurgitation ( see text).     9 - Mild tricuspid regurgitation.     10 - Grade 2 atheroma disease of aorta.      Special Treatments/ Procedures:      none     Discharge Medications:              Discharge Medication List as of 2/17/2018  9:32 AM           START taking these medications     Details   doxycycline (VIBRA-TABS) 100 MG tablet Take 1 tablet (100 mg total) by mouth every 12 (twelve) hours., Starting Sat 2/17/2018, Until Sat 2/24/2018, Normal       multivitamin (THERAGRAN) tablet Take 1 tablet by mouth once daily., Starting Sun 2/18/2018, OTC               CONTINUE these  medications which have CHANGED     Details   furosemide (LASIX) 40 MG tablet Take 1 tablet (40 mg total) by mouth 2 (two) times daily. If wt gain 2-3 lbs x 2-5 days, take TID. If no improvement, call MD, Starting Sat 2/17/2018, Normal               CONTINUE these medications which have NOT CHANGED     Details   aspirin (ECOTRIN) 81 MG EC tablet Take 81 mg by mouth once daily., Historical Med       atorvastatin (LIPITOR) 80 MG tablet Take 80 mg by mouth once daily., Historical Med       clopidogrel (PLAVIX) 75 mg tablet Take 75 mg by mouth once daily., Historical Med       FOLIC ACID/MULTIVIT-MIN/LUTEIN (CENTRUM SILVER ORAL) Take by mouth., Historical Med       lisinopril (PRINIVIL,ZESTRIL) 20 MG tablet Take 20 mg by mouth once daily., Historical Med       omega 3-dha-epa-fish oil (FISH OIL) 100-160-1,000 mg Cap Take by mouth., Historical Med       omeprazole (PRILOSEC) 20 MG capsule Take 20 mg by mouth once daily., Historical Med       sertraline (ZOLOFT) 25 MG tablet Take 25 mg by mouth once daily., Historical Med       meclizine (ANTIVERT) 32 MG tablet Take 32 mg by mouth 3 (three) times daily as needed., Historical Med                 Discharge Diet:cardiac diet with Normal Fluid intake of 1500 - 2000 mL per day     Activity: activity as tolerated     Discharge Condition: Stable     Disposition: Home or Self Care     Tests pending at the time of discharge:   outpatient referral to breast center for evaluation of breast edeam            Physical Exam:  General appearance: no distress, pale, elderly, well-groomed  Mental status: Alert and oriented x 3, pleasant  Neck: supple, thyroid not enlarged, no JVD  Pulm:   normal respiratory effort, CTA B, no c/w/r  Card: RRR, S1, S2 normal, +systolic murmur, no click, rub or gallop  Ext: 1+ edema to B mid-shin - improved  Skin: color, texture, turgor normal. L breast +erythema 10cm around nipple (similar to prior), but edema improved from prior     Above discharge and  hospital course reviewed.  No family hx of breast CA.  Patient with bilaterally chronically inverted nipples.  Clinically benign B reactive congestive edema of breast and breast skin with pitting edema, L > R.  Pt states she  Usually sleeps with her left side down.  Breast imaging reviewed with Dr Luiz June today and consistent with benign reactive edema of breast skin bilaterally..  Clinical suspicion is low.    3 mm punch biopsy of left superior breast skin performed today and submitted in formalin to pathology for r/o inflammatory breast CA although my clinical suspicion is low and I feel that benign edema is congestive in etiology due to heart issues.  Will phone review punch biopsy.  Pt encouraged to alternate sleeping on the other side to lessen some of the left breast edema.  Clinical impression is benign B breast edema, L > R, due to underlying R heart issues with tricuspid regurgitation, elevated CVP, and pulmonary HTN  F/U prn pending Left breast skin punch biopsy result.

## 2020-03-23 NOTE — PT/OT/SLP PROGRESS
"Physical Therapy Treatment    Patient Name:  Delmi Nunez   MRN:  [id number]    Recommendations:     Discharge Recommendations:  home with home health   Discharge Equipment Recommendations: walker, rolling   Barriers to discharge: None    Assessment:     Delmi Nunez is a 83 y.o. female admitted with a medical diagnosis of Acute on chronic congestive heart failure.  She presents with the following impairments/functional limitations:  weakness, impaired endurance, impaired self care skills, impaired functional mobilty, gait instability, impaired balance, decreased safety awareness, pain, edema . Pt was limited t/o session by LLE pain w/ mobility. Pt is much safer when ambulating w/ RW on this date due to instability noted when attempting to ambulate w/o AD. Pt also requires cueing for safety t/o gait trial. Pt will continue PT POC. She is recommended to receive home health upon D/C for further mobility training.    Rehab Prognosis:  Good; patient would benefit from acute skilled PT services to address these deficits and reach maximum level of function.      Recent Surgery: Procedure(s) (LRB):  TRANSESOPHAGEAL ECHOCARDIOGRAM (IVAN) (N/A) Day of Surgery    Plan:     During this hospitalization, patient to be seen 3 x/week to address the above listed problems via gait training, therapeutic activities, therapeutic exercises  · Plan of Care Expires:  03/12/18   Plan of Care Reviewed with: patient    Subjective     Communicated with nursing prior to session.  Patient found supine upon PT entry to room, agreeable to treatment.      Chief Complaint: LLE pain  Patient comments/goals: to return home  Pain/Comfort:  · Pain Rating 1: 6/10 (pt attributes pain to swelling- describes it as a "soreness")  · Location - Side 1: Left  · Location - Orientation 1: lower  · Location 1: leg  · Pain Addressed 1: Reposition  · Pain Rating Post-Intervention 1: 5/10    Patients cultural, spiritual, Sikhism conflicts given the " current situation: none reported    Objective:     Patient found with: telemetry, pulse ox (continuous), peripheral IV     General Precautions: Standard, fall   Orthopedic Precautions:N/A   Braces: N/A     Functional Mobility:  · Bed Mobility:     · Supine to Sit: independence, HOB flat and w/o rail  · Transfers:     · Sit to Stand:  modified independence with no AD  · From EOB  · Gait:   · ~150ft w/ RW and SBA for safety, cueing to slow down pace for safety  · Pt initially attempting to ambulate w/o AD but demo'd decreased WB ability to LLE due to pain/edema  · Balance:   · Static stand w/o AD w/ SPV      AM-PAC 6 CLICK MOBILITY  Turning over in bed (including adjusting bedclothes, sheets and blankets)?: 4  Sitting down on and standing up from a chair with arms (e.g., wheelchair, bedside commode, etc.): 4  Moving from lying on back to sitting on the side of the bed?: 4  Moving to and from a bed to a chair (including a wheelchair)?: 3  Need to walk in hospital room?: 3  Climbing 3-5 steps with a railing?: 3  Total Score: 21       Therapeutic Activities and Exercises:   Pt educated on safety w/ mobility. She verb understanding but will need reinforcement.     Patient left up in chair with all lines intact and call button in reach..    GOALS:    Physical Therapy Goals        Problem: Physical Therapy Goal    Goal Priority Disciplines Outcome Goal Variances Interventions   Physical Therapy Goal     PT/OT, PT Ongoing (interventions implemented as appropriate)     Description:  Goals to be met by: 2018     Patient will increase functional independence with mobility by performin. Supine to sit with [place]- MET 2/15/2018  2. Sit to supine with [place]  3. Sit to stand transfer with Modified [place] using AD or No AD- MET 2/15/2018  4. Bed to chair transfer with Modified [place] using AD or No AD  5. Gait x150 feet with Stand-by Assistance using AD or No AD- MET 2/15/2018  Revised 2/15/2018:  Gait x200ft w/ RW and SPV  6. Stand for x10 minutes with Stand-by Assistance while performing dynamic balance tasks  7. Lower extremity exercise program x15 reps per handout, with assistance as needed                       Time Tracking:     PT Received On: 02/15/18  PT Start Time: 1120     PT Stop Time: 1135  PT Total Time (min): 15 min     Billable Minutes: Gait Training 15 and Total Time 15    Treatment Type: Treatment  PT/PTA: PT     PTA Visit Number: 0     Joana Kathleen PT (pg. [phone])  02/15/2018   Alert and oriented, no focal deficits, no motor or sensory deficits.

## 2021-11-24 NOTE — ASSESSMENT & PLAN NOTE
IVAN shows tethered posterior leaflet with severe mitral regurgitation with a large centrally originating and posteriorly directed regugitant jet.  Likely ischemic MR (patient has SVG to RCA).       
 IVAN shows tethered posterior leaflet with severe mitral regurgitation with a large centrally originating and posteriorly directed regugitant jet.  Likely ischemic MR (patient has SVG to RCA).       
 Labs c/w hemolysis.        
 Labs c/w hemolysis.        
" Follows with Dr. Berrios at .   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   Hx of multiple lung nodules.   PET in July 2017 shows "increase in RUL nodular lesion from 1.1 cm to 1.4 cm with increased SUV 8, increased R apical nodule from 1 cm to 1.5 cm without SUV uptake. There is also nodular focuse of increased SUV activity posterior to the descending aorta about 3.7."  Per Dr. Berrios's note (in Media Tab): "right upper lobe mass increase can be due to ongoing fibrosis (she has had 2 biopsies which are negative for malignancy). This should not impede her aortic valve repair. She will receive a chest CT in 3 months, even if she has a cancer in the RUL despite having 2 negative biopsies, she is still a candidate for stereotactic radiation surgery."       "
" Follows with Dr. Berrios at .   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   Hx of multiple lung nodules.   PET in July 2017 shows "increase in RUL nodular lesion from 1.1 cm to 1.4 cm with increased SUV 8, increased R apical nodule from 1 cm to 1.5 cm without SUV uptake. There is also nodular focuse of increased SUV activity posterior to the descending aorta about 3.7."  Per Dr. Berrios's note (in Media Tab): "right upper lobe mass increase can be due to ongoing fibrosis (she has had 2 biopsies which are negative for malignancy). This should not impede her aortic valve repair. She will receive a chest CT in 3 months, even if she has a cancer in the RUL despite having 2 negative biopsies, she is still a candidate for stereotactic radiation surgery."       "
Albumin 2.8.   Frailty 2/4.  
Albumin 2.8.   Frailty 2/4.  
Controlled on lisinopril.  
Controlled on lisinopril.  
HILARY = 0.92 cm2, AVAi = 0.56 cm2/m2, peak velocity = 4.21 m/s, mean gradient = 46 mmHg, EF= 60%.  Can proceed with TAVR work-up after her PVL closure.   She will need TAVR CTA and PFTs.  High risk per Dr. Witt due to age, redo status and history of lung CA.   STS= 9.9%  Frailty: 2/4 (albumin and walk)  
HILARY = 0.92 cm2, AVAi = 0.56 cm2/m2, peak velocity = 4.21 m/s, mean gradient = 46 mmHg, EF= 60%.  Can proceed with TAVR work-up after her PVL closure.   She will need TAVR CTA and PFTs.  High risk per Dr. Witt due to age, redo status and history of lung CA.   STS= 9.9%  Frailty: 2/4 (albumin and walk)  
IVAN shows severe paravalvular aortic regurgitation with large, anterior PVL ( between LCC and RCC and under RCC). The regurgitant jet splits into two jets diagnoally across the LVOT.       
IVAN shows severe paravalvular aortic regurgitation with large, anterior PVL ( between LCC and RCC and under RCC). The regurgitant jet splits into two jets diagnoally across the LVOT.   S/p successful closure with a 12mm  II on 3/6/18.      
On Home O2 prn, started 1/29/18.   Currently on room air.   
On Home O2 prn, started 1/29/18.   Currently on room air.   
Placed at time of AVR + CABG in 2003.   Stable.   
Placed at time of AVR + CABG in 2003.   Stable.   
S/p single vessel CABG at the time of AVR in 2003.   Patent graft by [facility] in Nov 2017. Otherwise non-obstructive CAD.  On ASA and statin. No b-blocker for unknown reason -- will defer to primary cardiologist.   
S/p single vessel CABG at the time of AVR in 2003.   Patent graft by [facility] in Nov 2017. Otherwise non-obstructive CAD.  On ASA and statin. No b-blocker for unknown reason -- will defer to primary cardiologist.   
Seen on outpatient IVAN (in Media tab).   On ASA and statin.   
Seen on outpatient IVAN (in Media tab).   On ASA and statin.   
Stable.  On statin.  
Stable.  On statin.  
Was treated with Doxycycline x 2 weeks after her hospital discharge -- no improvement in sx.   Concerning for malignant process.  General Surgery to see today.   
Was treated with Doxycycline x 2 weeks after her hospital discharge -- no improvement in sx.   Concerning for malignant process.  Outpatient mammogram and appt with Dr. Lucero on 3/8/18.  
Well compensated clinically at this time.   On Lasix 40mg BID.   
Well compensated clinically at this time.   On Lasix 40mg BID.   
Gangrene of finger of left hand